# Patient Record
Sex: FEMALE | Race: WHITE | Employment: UNEMPLOYED | URBAN - METROPOLITAN AREA
[De-identification: names, ages, dates, MRNs, and addresses within clinical notes are randomized per-mention and may not be internally consistent; named-entity substitution may affect disease eponyms.]

---

## 2017-04-27 ENCOUNTER — TELEPHONE (OUTPATIENT)
Dept: FAMILY MEDICINE CLINIC | Age: 62
End: 2017-04-27

## 2017-04-27 NOTE — TELEPHONE ENCOUNTER
Patient call, patient requesting referral for gastro, would like to see Nat Taveras [phone], [phone] fax Please generate referral.  Do you need to see her first?

## 2017-05-24 ENCOUNTER — OFFICE VISIT (OUTPATIENT)
Dept: FAMILY MEDICINE CLINIC | Age: 62
End: 2017-05-24

## 2017-05-24 VITALS
TEMPERATURE: 97.5 F | RESPIRATION RATE: 18 BRPM | DIASTOLIC BLOOD PRESSURE: 68 MMHG | BODY MASS INDEX: 35.2 KG/M2 | SYSTOLIC BLOOD PRESSURE: 110 MMHG | HEART RATE: 75 BPM | OXYGEN SATURATION: 95 % | WEIGHT: 219 LBS | HEIGHT: 66 IN

## 2017-05-24 DIAGNOSIS — R10.84 GENERALIZED ABDOMINAL PAIN: ICD-10-CM

## 2017-05-24 DIAGNOSIS — R10.13 EPIGASTRIC PAIN: Primary | ICD-10-CM

## 2017-05-24 DIAGNOSIS — R11.2 NAUSEA AND VOMITING, INTRACTABILITY OF VOMITING NOT SPECIFIED, UNSPECIFIED VOMITING TYPE: ICD-10-CM

## 2017-05-24 NOTE — PROGRESS NOTES
1. Have you been to the ER, urgent care clinic since your last visit? Hospitalized since your last visit? No     2. Have you seen or consulted any other health care providers outside of the [address] [place] [address] since your last visit? Include any pap smears or colon screening. Yes , gyne, may have bladder lift  upcoming     Pt had previous umbilical hernia with revision, feels pain the same way. Weight gain, notes  moved back in .

## 2017-05-24 NOTE — MR AVS SNAPSHOT
Visit Information Date & Time Provider Department Dept. Phone Encounter #  
 5/24/2017  1:15 PM [address] [facility] [phone] [id number] Upcoming Health Maintenance Date Due Hepatitis C Screening 1955 BREAST CANCER SCRN MAMMOGRAM 5/12/2017 INFLUENZA AGE 9 TO ADULT 8/1/2017 COLON CANCER SCRN (BARIUM / CT COLO / FLX SIG) Q5 1/1/2019 PAP AKA CERVICAL CYTOLOGY 5/17/2020 DTaP/Tdap/Td series (2 - Td) 7/3/2024 Allergies as of 5/24/2017  Review Complete On: 5/24/2017 By: Rosmery Hampton MD  
  
 Severity Noted Reaction Type Reactions Amoxicillin  10/06/2015    Rash  
 Sulfa (Sulfonamide Antibiotics)  10/06/2015    Rash Tolectin [Tolmetin]  10/06/2015    Rash Current Immunizations  Reviewed on 10/6/2015 Name Date Influenza Vaccine 11/7/2013 Tdap 7/3/2014 Not reviewed this visit You Were Diagnosed With   
  
 Codes Comments Epigastric pain    -  Primary ICD-10-CM: R10.13 ICD-9-CM: 789.06 Generalized abdominal pain     ICD-10-CM: R10.84 ICD-9-CM: 789.07 Nausea and vomiting, intractability of vomiting not specified, unspecified vomiting type     ICD-10-CM: R11.2 ICD-9-CM: 787.01 Vitals BP Pulse Temp Resp Height(growth percentile) Weight(growth percentile) 110/68 (BP 1 Location: Left arm, BP Patient Position: Sitting) 75 97.5 °F (36.4 °C) (Oral) 18 5' 6\" (1.676 m) 219 lb (99.3 kg) SpO2 BMI OB Status Smoking Status 95% 35.35 kg/m2 Ablation Never Smoker Vitals History BMI and BSA Data Body Mass Index Body Surface Area  
 35.35 kg/m 2 2.15 m 2 Preferred Pharmacy Pharmacy Name Phone 259 [facility], [address] [place] [address] [phone] Your Updated Medication List  
  
   
This list is accurate as of: 5/24/17  1:45 PM.  Always use your most recent med list.  
  
  
  
  
 albuterol 90 mcg/actuation inhaler Commonly known as:  PROVENTIL HFA, VENTOLIN HFA, PROAIR HFA Take 2 Puffs by inhalation every six (6) hours as needed for Wheezing or Shortness of Breath. Biotin 2,500 mcg Cap Take 1,000 mcg by mouth daily. budesonide 3 mg capsule Commonly known as:  ENTOCORT EC Take 6 mg by mouth three (3) times daily. CITRIMAX PO Take  by mouth two (2) times a day. coenzyme q10-vitamin e 100-100 mg-unit Cap Take  by mouth daily. dicyclomine 10 mg capsule Commonly known as:  BENTYL Take 10 mg by mouth three (3) times daily. diphenoxylate-atropine 2.5-0.025 mg per tablet Commonly known as:  LOMOTIL Take  by mouth three (3) times daily as needed for Diarrhea. ibuprofen 800 mg tablet Commonly known as:  MOTRIN Take  by mouth every six (6) hours as needed for Pain. Lactobacillus acidophilus Cap Commonly known as:  BACID Take 2 Caps by mouth two (2) times a day. LORazepam 1 mg tablet Commonly known as:  ATIVAN  
TAKE 1 TABLET BY MOUTH TWICE DAILY AS NEEDED FOR ANXIETY  
  
 M-VIT PO Take  by mouth daily. magnesium oxide 400 mg tablet Commonly known as:  MAG-OX Take 400 mg by mouth two (2) times a day. naproxen 500 mg tablet Commonly known as:  NAPROSYN Take 500 mg by mouth two (2) times daily (with meals). Omega-3-DHA-EPA-Fish Oil 1,000 mg (120 mg-180 mg) Cap Take 2,000 mg by mouth daily. omeprazole 40 mg capsule Commonly known as:  PRILOSEC Take 1 Cap by mouth daily. REFRESH LIQUIGEL 1 % Dlgl Generic drug:  carboxymethylcellulose sodium Apply  to eye. RESTASIS 0.05 % ophthalmic emulsion Generic drug:  cycloSPORINE Administer 1 Drop to both eyes two (2) times a day. traZODone 100 mg tablet Commonly known as:  Luellen Loft Take 2 Tabs by mouth nightly as needed for Sleep.  
  
 triamcinolone acetonide 0.1 % topical cream  
Commonly known as:  KENALOG Apply  to affected area two (2) times a day. use thin layer  
  
 venlafaxine-SR 75 mg capsule Commonly known as:  EFFEXOR-XR Take 1 Cap by mouth daily. ZOLMitriptan 2.5 mg tablet Commonly known as:  ZOMIG Take 1 Tab by mouth as needed for Migraine. To-Do List   
 05/24/2017 Imaging:  CT ABD PELV W WO CONT Introducing [facility]! Soto Wilson introduces yourdelivery patient portal. Now you can access parts of your medical record, email your doctor's office, and request medication refills online. 1. In your internet browser, go to [email or web address] 2. Click on the First Time User? Click Here link in the Sign In box. You will see the New Member Sign Up page. 3. Enter your yourdelivery Access Code exactly as it appears below. You will not need to use this code after youve completed the sign-up process. If you do not sign up before the expiration date, you must request a new code. · yourdelivery Access Code: [email or web address] Expires: 8/22/2017  1:45 PM 
 
4. Enter the last four digits of your Social Security Number (xxxx) and Date of Birth (mm/dd/yyyy) as indicated and click Submit. You will be taken to the next sign-up page. 5. Create a yourdelivery ID. This will be your yourdelivery login ID and cannot be changed, so think of one that is secure and easy to remember. 6. Create a yourdelivery password. You can change your password at any time. 7. Enter your Password Reset Question and Answer. This can be used at a later time if you forget your password. 8. Enter your e-mail address. You will receive e-mail notification when new information is available in [address] 9. Click Sign Up. You can now view and download portions of your medical record. 10. Click the Download Summary menu link to download a portable copy of your medical information.  
 
If you have questions, please visit the Frequently Asked Questions section of the [employer]. Remember, [employer] is NOT to be used for urgent needs. For medical emergencies, dial 911. Now available from your iPhone and Android! Please provide this summary of care documentation to your next provider. Your primary care clinician is listed as Tamiko Landin. If you have any questions after today's visit, please call [phone].

## 2017-05-24 NOTE — PROGRESS NOTES
Assessment/Plan:    1. Epigastric pain and generalized abd pain with n/v - in setting of h/o umbilical hernia repair x 2 - will ck CT given level of pain and vomiting. If neg, will need GI referral for endoscopy. - CT ABD PELV W WO CONT; Future      The plan was discussed with the patient. The patient verbalized understanding and is in agreement with the plan. All medication potential side effects were discussed with the patient. Health Maintenance:   Health Maintenance   Topic Date Due    Hepatitis C Screening  1955    BREAST CANCER SCRN MAMMOGRAM  05/12/2017    INFLUENZA AGE 9 TO ADULT  08/01/2017    COLON CANCER SCRN (BARIUM / CT COLO / FLX SIG) Q5  01/01/2019    PAP AKA CERVICAL CYTOLOGY  05/17/2020    DTaP/Tdap/Td series (2 - Td) 07/03/2024    ZOSTER VACCINE AGE 60>  Completed       Josué Contreras is a 64 y.o. female and presents with Vomiting and Abdominal Pain     Subjective:  Pt c/o abd midline umbilical pain x 3 weeks. +n/v.   States it feels like when she had a umbilical hernia (has had it repaired 2004, 2005). Laying down makes the pain better. Any movement makes it worse. No diarrhea. Pain is 9/10    ROS:  Constitutional: No recent weight change. No weakness/fatigue. No f/c. Cardiovascular: No CP/palpitations. No FREDERICK/orthopnea/PND. Respiratory: No cough/sputum, dyspnea, wheezing. Gastointestinal: No dysphagia, reflux. No n/v. No constipation/diarrhea. No melena/rectal bleeding. The problem list was updated as a part of today's visit.   Patient Active Problem List   Diagnosis Code    Insomnia G47.00    Fibromyalgia M79.7    Anxiety and depression F41.9, F32.9    Migraines G43.909    Irritable bowel syndrome K58.9    Ulcerative colitis ([facility] [address] K51.90    Atopic rhinitis J30.9    Gastroesophageal reflux disease K21.9    Tear film insufficiency H04.129    Dry eye syndrome of both lacrimal glands H04.123    Pure hypercholesterolemia E78.00       The PS,  were reviewed. SH:  Social History   Substance Use Topics    Smoking status: Never Smoker    Smokeless tobacco: Never Used    Alcohol use Yes      Comment: Every once in awhile       Medications/Allergies:  Current Outpatient Prescriptions on File Prior to Visit   Medication Sig Dispense Refill    LORazepam (ATIVAN) 1 mg tablet TAKE 1 TABLET BY MOUTH TWICE DAILY AS NEEDED FOR ANXIETY 120 Tab 0    triamcinolone acetonide (KENALOG) 0.1 % topical cream Apply  to affected area two (2) times a day. use thin layer 45 g 0    carboxymethylcellulose sodium (REFRESH LIQUIGEL) 1 % dlgl Apply  to eye.  venlafaxine-SR (EFFEXOR-XR) 75 mg capsule Take 1 Cap by mouth daily. 90 Cap 1    traZODone (DESYREL) 100 mg tablet Take 2 Tabs by mouth nightly as needed for Sleep. 180 Tab 2    omeprazole (PRILOSEC) 40 mg capsule Take 1 Cap by mouth daily. 90 Cap 3    ZOLMitriptan (ZOMIG) 2.5 mg tablet Take 1 Tab by mouth as needed for Migraine. 27 Tab 2    ibuprofen (MOTRIN) 800 mg tablet Take  by mouth every six (6) hours as needed for Pain.  Lactobacillus acidophilus (BACID) cap Take 2 Caps by mouth two (2) times a day.  Biotin 2,500 mcg cap Take 1,000 mcg by mouth daily.  budesonide (ENTOCORT EC) 3 mg capsule Take 6 mg by mouth three (3) times daily.  DANIELLE PHOS/BRINDAL BERRY (CITRIMAX PO) Take  by mouth two (2) times a day.  dicyclomine (BENTYL) 10 mg capsule Take 10 mg by mouth three (3) times daily.  diphenoxylate-atropine (LOMOTIL) 2.5-0.025 mg per tablet Take  by mouth three (3) times daily as needed for Diarrhea.  magnesium oxide (MAG-OX) 400 mg tablet Take 400 mg by mouth two (2) times a day.  PV W-O DANIELLE/FERROUS FUMARATE/FA (M-VIT PO) Take  by mouth daily.  naproxen (NAPROSYN) 500 mg tablet Take 500 mg by mouth two (2) times daily (with meals).  coenzyme q10-vitamin e 100-100 mg-unit cap Take  by mouth daily.       Omega-3-DHA-EPA-Fish Oil 1,000 mg (120 mg-180 mg) cap Take 2,000 mg by mouth daily.  albuterol (PROVENTIL HFA, VENTOLIN HFA, PROAIR HFA) 90 mcg/actuation inhaler Take 2 Puffs by inhalation every six (6) hours as needed for Wheezing or Shortness of Breath. 1 Inhaler 3    cycloSPORINE (RESTASIS) 0.05 % ophthalmic emulsion Administer 1 Drop to both eyes two (2) times a day. No current facility-administered medications on file prior to visit. Allergies   Allergen Reactions    Amoxicillin Rash    Sulfa (Sulfonamide Antibiotics) Rash    Tolectin [Tolmetin] Rash       Objective:  Visit Vitals    /68 (BP 1 Location: Left arm, BP Patient Position: Sitting)    Pulse 75    Temp 97.5 °F (36.4 °C) (Oral)    Resp 18    Ht 5' 6\" (1.676 m)    Wt 219 lb (99.3 kg)    SpO2 95%    BMI 35.35 kg/m2      Constitutional: Well developed, nourished, no distress, alert, obese habitus, nontoxic   CV: S1, S2.  RRR. No murmurs/rubs. No thrills palpated. No carotid bruits. Intact distal pulses. No edema. Pulm: No abnormalities on inspection. Clear to auscultation bilaterally. No wheezing/rhonchi. Normal effort. GI: Soft, epigastric tenderness (maximal), mild tenderness over LUQ and periumbilical, nondistended. hypoactive bowel sounds. No rebound or guarding.      Labwork and Ancillary Studies:    CBC w/Diff  Lab Results   Component Value Date/Time    WBC 8.9 12/29/2015 09:17 AM    HGB 12.7 12/29/2015 09:17 AM    PLATELET 858 72/51/2049 09:17 AM         Basic Metabolic Profile/LFTs  Lab Results   Component Value Date/Time    Sodium 144 01/11/2016 02:21 PM    Potassium 3.7 01/11/2016 02:21 PM    Chloride 108 01/11/2016 02:21 PM    CO2 25 01/11/2016 02:21 PM    Anion gap 11 01/11/2016 02:21 PM    Glucose 95 01/11/2016 02:21 PM    BUN 11 01/11/2016 02:21 PM    Creatinine 0.96 01/11/2016 02:21 PM    BUN/Creatinine ratio 11 01/11/2016 02:21 PM    GFR est AA >60 01/11/2016 02:21 PM    GFR est non-AA 59 01/11/2016 02:21 PM    Calcium 8.7 01/11/2016 02:21 PM Lab Results   Component Value Date/Time    ALT (SGPT) 17 01/11/2016 02:21 PM    AST (SGOT) 15 01/11/2016 02:21 PM    Alk.  phosphatase 89 01/11/2016 02:21 PM    Bilirubin, total 0.4 01/11/2016 02:21 PM       Cholesterol  Lab Results   Component Value Date/Time    Cholesterol, total 211 02/19/2016 09:42 AM    HDL Cholesterol 55 02/19/2016 09:42 AM    LDL, calculated 124.8 02/19/2016 09:42 AM    Triglyceride 156 02/19/2016 09:42 AM    CHOL/HDL Ratio 3.8 02/19/2016 09:42 AM

## 2017-06-01 DIAGNOSIS — R10.84 GENERALIZED ABDOMINAL PAIN: ICD-10-CM

## 2017-06-01 DIAGNOSIS — R11.2 NAUSEA AND VOMITING, INTRACTABILITY OF VOMITING NOT SPECIFIED, UNSPECIFIED VOMITING TYPE: ICD-10-CM

## 2017-06-01 DIAGNOSIS — R10.13 EPIGASTRIC PAIN: ICD-10-CM

## 2017-06-02 ENCOUNTER — TELEPHONE (OUTPATIENT)
Dept: FAMILY MEDICINE CLINIC | Age: 62
End: 2017-06-02

## 2017-06-02 NOTE — TELEPHONE ENCOUNTER
Patient requesting colonoscopy and endoscopy to be ordered, due to abdomen pain and explosive diarrhea. Please advise.

## 2017-06-07 ENCOUNTER — TELEPHONE (OUTPATIENT)
Dept: FAMILY MEDICINE CLINIC | Age: 62
End: 2017-06-07

## 2017-07-18 ENCOUNTER — HOSPITAL ENCOUNTER (OUTPATIENT)
Dept: LAB | Age: 62
Discharge: HOME | End: 2017-07-18
Payer: COMMERCIAL

## 2017-07-18 ENCOUNTER — OFFICE VISIT (OUTPATIENT)
Dept: FAMILY MEDICINE CLINIC | Age: 62
End: 2017-07-18

## 2017-07-18 ENCOUNTER — TELEPHONE (OUTPATIENT)
Dept: FAMILY MEDICINE CLINIC | Age: 62
End: 2017-07-18

## 2017-07-18 VITALS
BODY MASS INDEX: 35.84 KG/M2 | RESPIRATION RATE: 16 BRPM | WEIGHT: 223 LBS | TEMPERATURE: 98 F | DIASTOLIC BLOOD PRESSURE: 82 MMHG | SYSTOLIC BLOOD PRESSURE: 128 MMHG | OXYGEN SATURATION: 96 % | HEART RATE: 91 BPM | HEIGHT: 66 IN

## 2017-07-18 DIAGNOSIS — Z11.59 SPECIAL SCREENING EXAMINATION FOR VIRAL DISEASE: ICD-10-CM

## 2017-07-18 DIAGNOSIS — J30.9 ALLERGIC RHINITIS, UNSPECIFIED ALLERGIC RHINITIS TRIGGER, UNSPECIFIED RHINITIS SEASONALITY: ICD-10-CM

## 2017-07-18 DIAGNOSIS — H65.92 MIDDLE EAR EFFUSION, LEFT: Primary | ICD-10-CM

## 2017-07-18 DIAGNOSIS — R53.83 FATIGUE, UNSPECIFIED TYPE: ICD-10-CM

## 2017-07-18 DIAGNOSIS — F51.04 PSYCHOPHYSIOLOGICAL INSOMNIA: ICD-10-CM

## 2017-07-18 DIAGNOSIS — M25.572 ACUTE LEFT ANKLE PAIN: ICD-10-CM

## 2017-07-18 DIAGNOSIS — F41.9 ANXIETY AND DEPRESSION: Chronic | ICD-10-CM

## 2017-07-18 DIAGNOSIS — F32.A ANXIETY AND DEPRESSION: Chronic | ICD-10-CM

## 2017-07-18 PROBLEM — Z79.899 CONTROLLED SUBSTANCE AGREEMENT SIGNED: Status: ACTIVE | Noted: 2017-07-18

## 2017-07-18 LAB
ALBUMIN SERPL BCP-MCNC: 3.6 G/DL (ref 3.4–5)
ALBUMIN/GLOB SERPL: 1 {RATIO} (ref 0.8–1.7)
ALP SERPL-CCNC: 80 U/L (ref 45–117)
ALT SERPL-CCNC: 18 U/L (ref 13–56)
ANION GAP BLD CALC-SCNC: 9 MMOL/L (ref 3–18)
AST SERPL W P-5'-P-CCNC: 15 U/L (ref 15–37)
BILIRUB SERPL-MCNC: 0.3 MG/DL (ref 0.2–1)
BUN SERPL-MCNC: 11 MG/DL (ref 7–18)
BUN/CREAT SERPL: 11 (ref 12–20)
CALCIUM SERPL-MCNC: 8.7 MG/DL (ref 8.5–10.1)
CHLORIDE SERPL-SCNC: 105 MMOL/L (ref 100–108)
CO2 SERPL-SCNC: 26 MMOL/L (ref 21–32)
CREAT SERPL-MCNC: 1 MG/DL (ref 0.6–1.3)
ERYTHROCYTE [DISTWIDTH] IN BLOOD BY AUTOMATED COUNT: 13.1 % (ref 11.6–14.5)
GLOBULIN SER CALC-MCNC: 3.5 G/DL (ref 2–4)
GLUCOSE SERPL-MCNC: 92 MG/DL (ref 74–99)
HCT VFR BLD AUTO: 38.3 % (ref 35–45)
HGB BLD-MCNC: 12.7 G/DL (ref 12–16)
MCH RBC QN AUTO: 29.5 PG (ref 24–34)
MCHC RBC AUTO-ENTMCNC: 33.2 G/DL (ref 31–37)
MCV RBC AUTO: 89.1 FL (ref 74–97)
PLATELET # BLD AUTO: 282 K/UL (ref 135–420)
PMV BLD AUTO: 9.8 FL (ref 9.2–11.8)
POTASSIUM SERPL-SCNC: 4.3 MMOL/L (ref 3.5–5.5)
PROT SERPL-MCNC: 7.1 G/DL (ref 6.4–8.2)
RBC # BLD AUTO: 4.3 M/UL (ref 4.2–5.3)
SODIUM SERPL-SCNC: 140 MMOL/L (ref 136–145)
TSH SERPL DL<=0.05 MIU/L-ACNC: 1.63 UIU/ML (ref 0.36–3.74)
VIT B12 SERPL-MCNC: 175 PG/ML (ref 211–911)
WBC # BLD AUTO: 11.9 K/UL (ref 4.6–13.2)

## 2017-07-18 PROCEDURE — 84443 ASSAY THYROID STIM HORMONE: CPT | Performed by: INTERNAL MEDICINE

## 2017-07-18 PROCEDURE — 82607 VITAMIN B-12: CPT | Performed by: INTERNAL MEDICINE

## 2017-07-18 PROCEDURE — 36415 COLL VENOUS BLD VENIPUNCTURE: CPT | Performed by: INTERNAL MEDICINE

## 2017-07-18 PROCEDURE — 80053 COMPREHEN METABOLIC PANEL: CPT | Performed by: INTERNAL MEDICINE

## 2017-07-18 PROCEDURE — 86803 HEPATITIS C AB TEST: CPT | Performed by: INTERNAL MEDICINE

## 2017-07-18 PROCEDURE — 85027 COMPLETE CBC AUTOMATED: CPT | Performed by: INTERNAL MEDICINE

## 2017-07-18 RX ORDER — LORAZEPAM 1 MG/1
TABLET ORAL
Qty: 120 TAB | Refills: 0 | Status: SHIPPED | OUTPATIENT
Start: 2017-07-18 | End: 2017-10-05 | Stop reason: SDUPTHER
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
## 2017-07-18 NOTE — PROGRESS NOTES
Greg Brannon is a 64 y.o. female here today with complaints of left ankle pain. 1. Have you been to the ER, urgent care clinic since your last visit? Hospitalized since your last visit? Yes Reason for visit: july 11,2017, ankle pain    2. Have you seen or consulted any other health care providers outside of the [address] [place] [address] since your last visit? Include any pap smears or colon screening.  Yes Reason for visit: urogynecology

## 2017-07-18 NOTE — MR AVS SNAPSHOT
Visit Information Date & Time Provider Department Dept. Phone Encounter #  
 7/18/2017  1:00 PM Myrtle Mcallister, Angella E Hilary Layne [phone] [id number] Upcoming Health Maintenance Date Due Hepatitis C Screening 1955 INFLUENZA AGE 9 TO ADULT 8/1/2017 BREAST CANCER SCRN MAMMOGRAM 4/5/2018 COLON CANCER SCRN (BARIUM / CT COLO / FLX SIG) Q5 1/1/2019 PAP AKA CERVICAL CYTOLOGY 5/17/2020 DTaP/Tdap/Td series (2 - Td) 7/3/2024 Allergies as of 7/18/2017  Review Complete On: 7/18/2017 By: Myrtle Mcallister MD  
  
 Severity Noted Reaction Type Reactions Amoxicillin  10/06/2015    Rash  
 Sulfa (Sulfonamide Antibiotics)  10/06/2015    Rash Tolectin [Tolmetin]  10/06/2015    Rash Current Immunizations  Reviewed on 10/6/2015 Name Date Influenza Vaccine 11/7/2013 Tdap 7/3/2014 Not reviewed this visit You Were Diagnosed With   
  
 Codes Comments Middle ear effusion, left    -  Primary ICD-10-CM: H65.92 
ICD-9-CM: 381. 4 Allergic rhinitis, unspecified allergic rhinitis trigger, unspecified rhinitis seasonality     ICD-10-CM: J30.9 ICD-9-CM: 477.9 Fatigue, unspecified type     ICD-10-CM: R53.83 ICD-9-CM: 780.79 Psychophysiological insomnia     ICD-10-CM: F51.04 
ICD-9-CM: 307.42 Anxiety and depression     ICD-10-CM: F41.9, F32.9 ICD-9-CM: 300.00, 311 Acute left ankle pain     ICD-10-CM: M25.572 ICD-9-CM: 719.47 Special screening examination for viral disease     ICD-10-CM: Z11.59 
ICD-9-CM: V73.99 Vitals BP Pulse Temp Resp Height(growth percentile) Weight(growth percentile) 128/82 91 98 °F (36.7 °C) 16 5' 6\" (1.676 m) 223 lb (101.2 kg) SpO2 BMI OB Status Smoking Status 96% 35.99 kg/m2 Ablation Never Smoker Vitals History BMI and BSA Data Body Mass Index Body Surface Area 35.99 kg/m 2 2.17 m 2 Preferred Pharmacy Pharmacy Name Phone 69 [facility] 1394 [address] Your Updated Medication List  
  
   
This list is accurate as of: 7/18/17  1:25 PM.  Always use your most recent med list.  
  
  
  
  
 albuterol 90 mcg/actuation inhaler Commonly known as:  PROVENTIL HFA, VENTOLIN HFA, PROAIR HFA Take 2 Puffs by inhalation every six (6) hours as needed for Wheezing or Shortness of Breath. Biotin 2,500 mcg Cap Take 1,000 mcg by mouth daily. LORazepam 1 mg tablet Commonly known as:  ATIVAN  
TAKE 1 TABLET BY MOUTH TWICE DAILY AS NEEDED FOR ANXIETY  
  
 naproxen 500 mg tablet Commonly known as:  NAPROSYN Take 500 mg by mouth two (2) times daily (with meals). Omega-3-DHA-EPA-Fish Oil 1,000 mg (120 mg-180 mg) Cap Take 2,000 mg by mouth daily. omeprazole 40 mg capsule Commonly known as:  PRILOSEC Take 1 Cap by mouth daily. REFRESH LIQUIGEL 1 % Dlgl Generic drug:  carboxymethylcellulose sodium Apply  to eye. RESTASIS 0.05 % ophthalmic emulsion Generic drug:  cycloSPORINE Administer 1 Drop to both eyes two (2) times a day. traZODone 100 mg tablet Commonly known as:  Illene Dus Take 2 Tabs by mouth nightly as needed for Sleep.  
  
 venlafaxine-SR 75 mg capsule Commonly known as:  EFFEXOR-XR Take 1 Cap by mouth daily. ZOLMitriptan 2.5 mg tablet Commonly known as:  ZOMIG Take 1 Tab by mouth as needed for Migraine. Prescriptions Printed Refills LORazepam (ATIVAN) 1 mg tablet 0 Sig: TAKE 1 TABLET BY MOUTH TWICE DAILY AS NEEDED FOR ANXIETY Class: Print We Performed the Following REFERRAL TO ALLERGY [REF5 Custom] Comments:  
 Please evaluate patient for allergies, middle ear effusion REFERRAL TO ORTHOPEDIC SURGERY [REF62 Custom] Comments:  
 Please evaluate patient for L ankle pain and effusion. To-Do List   
 07/18/2017 Lab:  CBC W/O DIFF   
  
 07/18/2017 Lab: HCV AB W/RFLX TO SAILAJA   
  
 07/18/2017 Lab:  METABOLIC PANEL, COMPREHENSIVE   
  
 07/18/2017 Lab:  TSH 3RD GENERATION   
  
 07/18/2017 Lab:  VITAMIN B12 Referral Information Referral ID Referred By Referred To  
  
 [id number] Juan Shelley MD   
   [address], [place] Agu[place] Phone: [phone] Fax: [phone] Visits Status Start Date End Date 1 New Request 7/18/17 7/18/18 If your referral has a status of pending review or denied, additional information will be sent to support the outcome of this decision. Referral ID Referred By Referred To  
 [id number] Jonas Pica V Not Available Visits Status Start Date End Date 1 New Request 7/18/17 7/18/18 If your referral has a status of pending review or denied, additional information will be sent to support the outcome of this decision. Introducing \[facility]\"" & HEALTH SERVICES! [facility] introduces [employer] patient portal. Now you can access parts of your medical record, email your doctor's office, and request medication refills online. 1. In your internet browser, go to [email or web address] 2. Click on the First Time User? Click Here link in the Sign In box. You will see the New Member Sign Up page. 3. Enter your [email or web address] Access Code exactly as it appears below. You will not need to use this code after youve completed the sign-up process. If you do not sign up before the expiration date, you must request a new code. · [email or web address] Access Code: [email or web address] Expires: 8/22/2017  1:45 PM 
 
4. Enter the last four digits of your Social Security Number (xxxx) and Date of Birth (mm/dd/yyyy) as indicated and click Submit. You will be taken to the next sign-up page. 5. Create a [employer] ID. This will be your [email or web address] login ID and cannot be changed, so think of one that is secure and easy to remember. 6. Create a [employer] password. You can change your password at any time. 7. Enter your Password Reset Question and Answer. This can be used at a later time if you forget your password. 8. Enter your e-mail address. You will receive e-mail notification when new information is available in [address] 9. Click Sign Up. You can now view and download portions of your medical record. 10. Click the Download Summary menu link to download a portable copy of your medical information. If you have questions, please visit the Frequently Asked Questions section of the [employer] website. Remember, [employer] is NOT to be used for urgent needs. For medical emergencies, dial 911. Now available from your iPhone and Android! Please provide this summary of care documentation to your next provider. Your primary care clinician is listed as Tamiko 13. If you have any questions after today's visit, please call [phone].

## 2017-07-19 DIAGNOSIS — E53.8 B12 DEFICIENCY: Primary | ICD-10-CM

## 2017-07-19 LAB
HCV AB S/CO SERPL IA: <0.1 S/CO RATIO (ref 0–0.9)
HCV AB SERPL QL IA: NORMAL

## 2017-07-19 NOTE — PROGRESS NOTES
Tell pt her b12 is low. I want her to start taking the oral b12 supplement she bought. We will repeat the levels in one month.

## 2017-07-19 NOTE — PROGRESS NOTES
Patient notified of results she voiced understanding  and will start oral b 12 and come back to repeat in one month

## 2017-07-25 ENCOUNTER — TELEPHONE (OUTPATIENT)
Dept: FAMILY MEDICINE CLINIC | Age: 62
End: 2017-07-25

## 2017-07-25 NOTE — TELEPHONE ENCOUNTER
Pt went to [facility] (7/12/17) and received 10 days of Hydrocodone (5-325 1 tab by mouth every 4 hr or as needed for pain) for pt's ankle. Pt is now out of the medication and requesting a refill until she can get in to see the Ortho doctor.

## 2017-09-18 ENCOUNTER — OFFICE VISIT (OUTPATIENT)
Dept: FAMILY MEDICINE CLINIC | Age: 62
End: 2017-09-18

## 2017-09-18 VITALS
TEMPERATURE: 98.1 F | HEIGHT: 66 IN | WEIGHT: 230.6 LBS | HEART RATE: 78 BPM | SYSTOLIC BLOOD PRESSURE: 126 MMHG | OXYGEN SATURATION: 96 % | BODY MASS INDEX: 37.06 KG/M2 | DIASTOLIC BLOOD PRESSURE: 78 MMHG

## 2017-09-18 DIAGNOSIS — F41.9 ANXIETY AND DEPRESSION: Chronic | ICD-10-CM

## 2017-09-18 DIAGNOSIS — E53.8 B12 DEFICIENCY: ICD-10-CM

## 2017-09-18 DIAGNOSIS — F32.A ANXIETY AND DEPRESSION: Chronic | ICD-10-CM

## 2017-09-18 DIAGNOSIS — M79.7 FIBROMYALGIA: Primary | Chronic | ICD-10-CM

## 2017-09-18 DIAGNOSIS — R53.82 CHRONIC FATIGUE: ICD-10-CM

## 2017-09-18 RX ORDER — VENLAFAXINE HYDROCHLORIDE 150 MG/1
150 CAPSULE, EXTENDED RELEASE ORAL DAILY
Qty: 90 CAP | Refills: 0 | Status: SHIPPED | OUTPATIENT
Start: 2017-09-18 | End: 2018-01-15 | Stop reason: ALTCHOICE

## 2017-09-18 NOTE — PROGRESS NOTES
Dalton Ramirez is a 58 y.o. female  Chief Complaint   Patient presents with    Medication Evaluation    Fatigue     1. Have you been to the ER, urgent care clinic or hospitalized since your last visit? NO.     2. Have you seen or consulted any other health care providers outside of the [address] [place] [address] since your last visit (Include any pap smears or colon screening)? NO      Do you have an Advanced Directive? NO    Would you like information on Advanced Directives?  NO

## 2017-09-18 NOTE — MR AVS SNAPSHOT
Visit Information Date & Time Provider Department Dept. Phone Encounter #  
 9/18/2017  3:15 PM Jose BenjaminRd [facility] [phone] [id number] Upcoming Health Maintenance Date Due  
 BREAST CANCER SCRN MAMMOGRAM 4/5/2018 COLON CANCER SCRN (BARIUM / CT COLO / FLX SIG) Q5 1/1/2019 PAP AKA CERVICAL CYTOLOGY 5/17/2020 DTaP/Tdap/Td series (2 - Td) 7/3/2024 Allergies as of 9/18/2017  Review Complete On: 7/18/2017 By: Jose Benjamin MD  
  
 Severity Noted Reaction Type Reactions Amoxicillin  10/06/2015    Rash  
 Sulfa (Sulfonamide Antibiotics)  10/06/2015    Rash Tolectin [Tolmetin]  10/06/2015    Rash Current Immunizations  Reviewed on 10/6/2015 Name Date Influenza Vaccine 11/7/2013 Tdap 7/3/2014 Not reviewed this visit You Were Diagnosed With   
  
 Codes Comments Fibromyalgia    -  Primary ICD-10-CM: M79.7 ICD-9-CM: 729.1 Chronic fatigue     ICD-10-CM: R53.82 
ICD-9-CM: 780.79   
 B12 deficiency     ICD-10-CM: E53.8 ICD-9-CM: 266.2 Anxiety and depression     ICD-10-CM: F41.9, F32.9 ICD-9-CM: 300.00, 311 Vitals BP Pulse Temp Height(growth percentile) Weight(growth percentile) SpO2  
 126/78 (BP 1 Location: Right arm, BP Patient Position: Sitting) 78 98.1 °F (36.7 °C) (Oral) 5' 6\" (1.676 m) 230 lb 9.6 oz (104.6 kg) 96% BMI OB Status Smoking Status 37.22 kg/m2 Ablation Never Smoker Vitals History BMI and BSA Data Body Mass Index Body Surface Area  
 37.22 kg/m 2 2.21 m 2 Preferred Pharmacy Pharmacy Name Phone 69 [facility] 3742 5940 [address] Your Updated Medication List  
  
   
This list is accurate as of: 9/18/17  3:50 PM.  Always use your most recent med list.  
  
  
  
  
 albuterol 90 mcg/actuation inhaler Commonly known as:  PROVENTIL HFA, VENTOLIN HFA, PROAIR HFA  
 Take 2 Puffs by inhalation every six (6) hours as needed for Wheezing or Shortness of Breath. Biotin 2,500 mcg Cap Take 1,000 mcg by mouth daily. LORazepam 1 mg tablet Commonly known as:  ATIVAN  
TAKE 1 TABLET BY MOUTH TWICE DAILY AS NEEDED FOR ANXIETY  
  
 naproxen 500 mg tablet Commonly known as:  NAPROSYN Take 500 mg by mouth two (2) times daily (with meals). Omega-3-DHA-EPA-Fish Oil 1,000 mg (120 mg-180 mg) Cap Take 2,000 mg by mouth daily. omeprazole 40 mg capsule Commonly known as:  PRILOSEC Take 1 Cap by mouth daily. REFRESH LIQUIGEL 1 % Dlgl Generic drug:  carboxymethylcellulose sodium Apply  to eye. RESTASIS 0.05 % ophthalmic emulsion Generic drug:  cycloSPORINE Administer 1 Drop to both eyes two (2) times a day. traZODone 100 mg tablet Commonly known as:  Eward Sheets Take 2 Tabs by mouth nightly as needed for Sleep.  
  
 venlafaxine- mg capsule Commonly known as:  EFFEXOR-XR Take 1 Cap by mouth daily. ZOLMitriptan 2.5 mg tablet Commonly known as:  ZOMIG Take 1 Tab by mouth as needed for Migraine. Prescriptions Sent to Pharmacy Refills  
 venlafaxine-SR (EFFEXOR-XR) 150 mg capsule 0 Sig: Take 1 Cap by mouth daily. Class: Normal  
 Pharmacy: [facility] [address]  #: [phone] Route: Oral  
  
We Performed the Following REFERRAL TO PHYSICAL THERAPY [GKX35 Custom] Comments:  
 Please evaluate patient for fibromyalgia program  
  
To-Do List   
 09/18/2017 Lab:  TSH 3RD GENERATION Referral Information Referral ID Referred By Referred To  
  
 [id number] [facility] V Not Available Visits Status Start Date End Date 1 New Request 9/18/17 9/18/18 If your referral has a status of pending review or denied, additional information will be sent to support the outcome of this decision. Introducing [facility] & HEALTH SERVICES! [facility] introduces [employer] patient portal. Now you can access parts of your medical record, email your doctor's office, and request medication refills online. 1. In your internet browser, go to [email or web address] 2. Click on the First Time User? Click Here link in the Sign In box. You will see the New Member Sign Up page. 3. Enter your [employer] Access Code exactly as it appears below. You will not need to use this code after youve completed the sign-up process. If you do not sign up before the expiration date, you must request a new code. · [employer] Access Code: [email or web address] Expires: 12/17/2017  3:50 PM 
 
4. Enter the last four digits of your Social Security Number (xxxx) and Date of Birth (mm/dd/yyyy) as indicated and click Submit. You will be taken to the next sign-up page. 5. Create a [employer] ID. This will be your [employer] login ID and cannot be changed, so think of one that is secure and easy to remember. 6. Create a [employer] password. You can change your password at any time. 7. Enter your Password Reset Question and Answer. This can be used at a later time if you forget your password. 8. Enter your e-mail address. You will receive e-mail notification when new information is available in [address] 9. Click Sign Up. You can now view and download portions of your medical record. 10. Click the Download Summary menu link to download a portable copy of your medical information. If you have questions, please visit the Frequently Asked Questions section of the [employer] website. Remember, [employer] is NOT to be used for urgent needs. For medical emergencies, dial 911. Now available from your iPhone and Android! Please provide this summary of care documentation to your next provider. Your primary care clinician is listed as Tamiko 13. If you have any questions after today's visit, please call [phone].

## 2017-09-18 NOTE — PROGRESS NOTES
Assessment/Plan:    1. Fibromyalgia  - REFERRAL TO PHYSICAL THERAPY    2. Chronic fatigue  - TSH 3RD GENERATION; Future    3. B12 deficiency  -liz labs    4. Anxiety and depression-uncontrolled. incr dose to 150mg. F/u in 1mo  - venlafaxine-SR [facility] 150 mg capsule; Take 1 Cap by mouth daily. Dispense: 90 Cap; Refill: 0    The plan was discussed with the patient. The patient verbalized understanding and is in agreement with the plan. All medication potential side effects were discussed with the patient. Health Maintenance:   Health Maintenance   Topic Date Due    BREAST CANCER SCRN MAMMOGRAM  04/05/2018    COLON CANCER SCRN (BARIUM / CT COLO / FLX SIG) Q5  01/01/2019    PAP AKA CERVICAL CYTOLOGY  05/17/2020    DTaP/Tdap/Td series (2 - Td) 07/03/2024    Hepatitis C Screening  Completed    ZOSTER VACCINE AGE 60>  Completed    INFLUENZA AGE 9 TO ADULT  Addressed       Berkeley Oppenheim is a 58 y.o. female and presents with Medication Evaluation and Fatigue     Subjective:  Pt c/o L ear pain. States she's been doing otc nasal steroid. Didn't make appt with ENT b/c she lost phone number. She c/o fatigue and just \"wanting to sleep\" x 1 week. She states she has no energy. Was low on b12, hasn't rechecked her eyes. PHQ9 indicates moderately severe depression. insomnia - states one trazodone is not enough, but two is too much. She is taking 1.5 tabs and thinks it's only helping ok. ROS:  Constitutional: No recent weight change. No weakness/+fatigue. No f/c. HENT: No HA, dizziness. No hearing loss/tinnitus. No nasal congestion/discharge. Eyes: No change in vision, double/blurred vision or eye pain/redness. Cardiovascular: No CP/palpitations. No FREDERICK/orthopnea/PND. Respiratory: No cough/sputum, dyspnea, wheezing. Gastointestinal: No dysphagia, reflux. No n/v. No constipation/diarrhea. No melena/rectal bleeding. Genitourinary: No dysuria, urinary hesitancy, nocturia, hematuria. No incontinence. Musculoskeletal: No joint pain/stiffness. No muscle pain/tenderness. Endo: No heat/cold intolerance, no polyuria/polydypsia. Heme: No h/o anemia. No easy bleeding/bruising. Allergy/Immunology: No seasonal rhinitis. Denies frequent colds, sinus/ear infections. Neurological: No seizures/numbness/weakness. No paresthesias. Psychiatric:  No depression, anxiety. The problem list was updated as a part of today's visit. Patient Active Problem List   Diagnosis Code    Insomnia G47.00    Fibromyalgia M79.7    Anxiety and depression F41.9, F32.9    Migraines G43.909    Irritable bowel syndrome K58.9    Ulcerative colitis ([facility] [address] K51.90    Atopic rhinitis J30.9    Gastroesophageal reflux disease K21.9    Tear film insufficiency H04.129    Dry eye syndrome of both lacrimal glands H04.123    Pure hypercholesterolemia E78.00    Controlled substance agreement signed Z79.899    B12 deficiency E53.8       The PSH, FH were reviewed. SH:  Social History   Substance Use Topics    Smoking status: Never Smoker    Smokeless tobacco: Never Used    Alcohol use Yes      Comment: Every once in awhile       Medications/Allergies:  Current Outpatient Prescriptions on File Prior to Visit   Medication Sig Dispense Refill    LORazepam (ATIVAN) 1 mg tablet TAKE 1 TABLET BY MOUTH TWICE DAILY AS NEEDED FOR ANXIETY 120 Tab 0    carboxymethylcellulose sodium (REFRESH LIQUIGEL) 1 % dlgl Apply  to eye.  venlafaxine-SR (EFFEXOR-XR) 75 mg capsule Take 1 Cap by mouth daily. 90 Cap 1    traZODone (DESYREL) 100 mg tablet Take 2 Tabs by mouth nightly as needed for Sleep. 180 Tab 2    omeprazole (PRILOSEC) 40 mg capsule Take 1 Cap by mouth daily. 90 Cap 3    ZOLMitriptan (ZOMIG) 2.5 mg tablet Take 1 Tab by mouth as needed for Migraine. 27 Tab 2    Biotin 2,500 mcg cap Take 1,000 mcg by mouth daily.  naproxen (NAPROSYN) 500 mg tablet Take 500 mg by mouth two (2) times daily (with meals).  Omega-3-DHA-EPA-Fish Oil 1,000 mg (120 mg-180 mg) cap Take 2,000 mg by mouth daily.  albuterol (PROVENTIL HFA, VENTOLIN HFA, PROAIR HFA) 90 mcg/actuation inhaler Take 2 Puffs by inhalation every six (6) hours as needed for Wheezing or Shortness of Breath. 1 Inhaler 3    cycloSPORINE (RESTASIS) 0.05 % ophthalmic emulsion Administer 1 Drop to both eyes two (2) times a day. No current facility-administered medications on file prior to visit. Allergies   Allergen Reactions    Amoxicillin Rash    Sulfa (Sulfonamide Antibiotics) Rash    Tolectin [Tolmetin] Rash       Objective:  Visit Vitals    /78 (BP 1 Location: Right arm, BP Patient Position: Sitting)    Pulse 78    Temp 98.1 °F (36.7 °C) (Oral)    Ht 5' 6\" (1.676 m)    Wt 230 lb 9.6 oz (104.6 kg)    SpO2 96%    BMI 37.22 kg/m2      Constitutional: Well developed, nourished, no distress, alert, obese habitus   HENT: Exterior ears and tympanic membranes normal bilaterally. Supple neck. No thyromegaly or lymphadenopathy. Oropharynx clear and moist mucous membranes. +bilat middle ear effusion   Eyes: Conjunctiva normal. PERRL. CV: S1, S2.  RRR. No murmurs/rubs. No thrills palpated. No carotid bruits. Intact distal pulses. No edema. Pulm: No abnormalities on inspection. Clear to auscultation bilaterally. No wheezing/rhonchi. Normal effort. Psych: Appropriate affect, judgement and insight. Short-term memory intact.        PHQ over the last two weeks 9/18/2017   Little interest or pleasure in doing things Nearly every day   Feeling down, depressed or hopeless Nearly every day   Total Score PHQ 2 6   Trouble falling or staying asleep, or sleeping too much Nearly every day   Feeling tired or having little energy Nearly every day   Poor appetite or overeating Nearly every day   Feeling bad about yourself - or that you are a failure or have let yourself or your family down Not at all   Trouble concentrating on things such as school, work, reading or watching TV More than half the days   Moving or speaking so slowly that other people could have noticed; or the opposite being so fidgety that others notice Not at all   Thoughts of being better off dead, or hurting yourself in some way Not at all   PHQ 9 Score 17

## 2017-10-05 DIAGNOSIS — F41.9 ANXIETY AND DEPRESSION: Chronic | ICD-10-CM

## 2017-10-05 DIAGNOSIS — F32.A ANXIETY AND DEPRESSION: Chronic | ICD-10-CM

## 2017-10-05 DIAGNOSIS — F51.04 PSYCHOPHYSIOLOGICAL INSOMNIA: ICD-10-CM

## 2017-10-05 RX ORDER — LORAZEPAM 1 MG/1
TABLET ORAL
Qty: 120 TAB | Refills: 1 | Status: SHIPPED | OUTPATIENT
Start: 2017-10-05 | End: 2018-01-05

## 2017-10-23 ENCOUNTER — HOSPITAL ENCOUNTER (OUTPATIENT)
Dept: LAB | Age: 62
Discharge: HOME | End: 2017-10-23
Payer: COMMERCIAL

## 2017-10-23 DIAGNOSIS — E53.8 B12 DEFICIENCY: ICD-10-CM

## 2017-10-23 DIAGNOSIS — R53.82 CHRONIC FATIGUE: ICD-10-CM

## 2017-10-23 LAB
TSH SERPL DL<=0.05 MIU/L-ACNC: 1.5 UIU/ML (ref 0.36–3.74)
VIT B12 SERPL-MCNC: 533 PG/ML (ref 211–911)

## 2017-10-23 PROCEDURE — 36415 COLL VENOUS BLD VENIPUNCTURE: CPT | Performed by: INTERNAL MEDICINE

## 2017-10-23 PROCEDURE — 82607 VITAMIN B-12: CPT | Performed by: INTERNAL MEDICINE

## 2017-10-23 PROCEDURE — 84443 ASSAY THYROID STIM HORMONE: CPT | Performed by: INTERNAL MEDICINE

## 2017-10-23 RX ORDER — OMEPRAZOLE 40 MG/1
40 CAPSULE, DELAYED RELEASE ORAL DAILY
Qty: 90 CAP | Refills: 3 | Status: SHIPPED | OUTPATIENT
Start: 2017-10-23 | End: 2018-06-27 | Stop reason: SDUPTHER

## 2017-11-28 DIAGNOSIS — F51.04 PSYCHOPHYSIOLOGICAL INSOMNIA: ICD-10-CM

## 2017-11-29 RX ORDER — TRAZODONE HYDROCHLORIDE 100 MG/1
TABLET ORAL
Qty: 180 TAB | Refills: 2 | Status: SHIPPED | OUTPATIENT
Start: 2017-11-29 | End: 2018-06-27 | Stop reason: SDUPTHER

## 2018-01-15 ENCOUNTER — TELEPHONE (OUTPATIENT)
Dept: FAMILY MEDICINE CLINIC | Age: 63
End: 2018-01-15

## 2018-01-15 ENCOUNTER — OFFICE VISIT (OUTPATIENT)
Dept: FAMILY MEDICINE CLINIC | Age: 63
End: 2018-01-15

## 2018-01-15 VITALS
HEART RATE: 63 BPM | SYSTOLIC BLOOD PRESSURE: 118 MMHG | RESPIRATION RATE: 20 BRPM | DIASTOLIC BLOOD PRESSURE: 82 MMHG | WEIGHT: 224.8 LBS | TEMPERATURE: 98.4 F | OXYGEN SATURATION: 97 % | BODY MASS INDEX: 36.13 KG/M2 | HEIGHT: 66 IN

## 2018-01-15 DIAGNOSIS — F32.A ANXIETY AND DEPRESSION: Primary | Chronic | ICD-10-CM

## 2018-01-15 DIAGNOSIS — F41.9 ANXIETY AND DEPRESSION: Primary | Chronic | ICD-10-CM

## 2018-01-15 RX ORDER — DESVENLAFAXINE SUCCINATE 50 MG/1
50 TABLET, EXTENDED RELEASE ORAL DAILY
Qty: 30 TAB | Refills: 1 | Status: SHIPPED | OUTPATIENT
Start: 2018-01-15 | End: 2018-02-20 | Stop reason: SDUPTHER

## 2018-01-15 NOTE — PATIENT INSTRUCTIONS
Desvenlafaxine (By mouth)   Desvenlafaxine (aji-uit-ub-FAX-een)  Treats depression. This medicine is an SSNRI. Brand Name(s): Giovanni Mcneil   There may be other brand names for this medicine. When This Medicine Should Not Be Used: This medicine is not right for everyone. Do not use it if you had an allergic reaction to desvenlafaxine or venlafaxine. How to Use This Medicine:   Long Acting Tablet  · Take your medicine as directed. Your dose may need to be changed several times to find what works best for you. It is best to take this medicine at the same time each day. · Swallow the tablet whole with water. Do not dissolve, crush, break, or chew the tablet. · You may have to take this medicine for several weeks before you feel better. If you feel that the medicine is not working well, do not take more than your normal dose. Call your doctor for instructions. · If you take the extended-release tablet, part of the tablet may pass into your stools. This is normal and is nothing to worry about. · This medicine should come with a Medication Guide. Ask your pharmacist for a copy if you do not have one. · Missed dose: Take a dose as soon as you remember. If it is almost time for your next dose, wait until then and take a regular dose. Do not take extra medicine to make up for a missed dose. · Store the medicine in a closed container at room temperature, away from heat, moisture, and direct light. Drugs and Foods to Avoid:   Ask your doctor or pharmacist before using any other medicine, including over-the-counter medicines, vitamins, and herbal products. · Do not use this medicine together with an MAO inhibitor (MAOI) or if you have used an MAOI within the past 14 days. Do not take an MAOI for at least 7 days after you stop this medicine. · Do not take any medicine that contains venlafaxine while you are using this medicine. · Some medicines can affect how desvenlafaxine works.  Tell your doctor if you are using any of the following:   ¨ Atomoxetine, buspirone, dextromethorphan, fentanyl, ketoconazole, lithium, metoprolol, midazolam, nebivolol, perphenazine, Vern's wort, tolterodine, tramadol, or a tryptophan supplement  ¨ Amphetamines  ¨ Blood thinner (including warfarin)  ¨ Diuretic (water pill)  ¨ NSAID pain or arthritis medicine (including aspirin, diclofenac, ibuprofen, naproxen)  ¨ Triptan medicine to treat migraine headaches  · Do not drink alcohol while you are using this medicine. Warnings While Using This Medicine:   · Tell your doctor if you are pregnant or breastfeeding, or if you have kidney disease, liver disease, bleeding problems, glaucoma, heart or blood vessel disease, high blood pressure, high cholesterol, or a history of maria elena, seizures, or stroke. · For some children, teenagers, and young adults, this medicine may increase mental or emotional problems. This may lead to thoughts of suicide and violence. Talk with your doctor right away if you have any thoughts or behavior changes that concern you. Tell your doctor if you or anyone in your family has a history of bipolar disorder or suicide attempts. · This medicine may cause the following problems:   ¨ Serotonin syndrome (may be life-threatening)  ¨ Increased risk of bleeding problems  ¨ Low sodium levels in the blood  · This medicine may make you dizzy or drowsy. Do not drive or do anything that could be dangerous until you know how this medicine affects you. · Do not stop using this medicine suddenly. Your doctor will need to slowly decrease your dose before you stop it completely. · Tell any doctor or dentist who treats you that you are using this medicine. This medicine may affect certain medical test results. · Your doctor will do lab tests at regular visits to check on the effects of this medicine. Keep all appointments. · Keep all medicine out of the reach of children. Never share your medicine with anyone.   Possible Side Effects While Using This Medicine:   Call your doctor right away if you notice any of these side effects:  · Allergic reaction: Itching or hives, swelling in your face or hands, swelling or tingling in your mouth or throat, chest tightness, trouble breathing  · Anxiety, restlessness, fever, sweating, muscle spasms, nausea, vomiting, diarrhea, seeing or hearing things that are not there  · Blistering, peeling, red skin rash  · Chest pain, cough, trouble breathing  · Confusion, weakness, muscle twitching, headache, trouble concentrating, unsteadiness  · Feeling more excited or energetic than usual, trouble sleeping  · Lightheadedness, fainting, fast or pounding heartbeat  · Seizures  · Thoughts of hurting yourself or others, unusual behavior  · Unusual bleeding or bruising  If you notice these less serious side effects, talk with your doctor:   · Decreased appetite, constipation  · Dizziness  · Dry mouth  · Eye pain, vision changes, seeing halos around lights  · Sexual problems  · Tiredness, sleepiness  If you notice other side effects that you think are caused by this medicine, tell your doctor. Call your doctor for medical advice about side effects. You may report side effects to FDA at 5-320-FDA-5378  © 2017 [address]  Information is for End User's use only and may not be sold, redistributed or otherwise used for commercial purposes. The above information is an  only. It is not intended as medical advice for individual conditions or treatments. Talk to your doctor, nurse or pharmacist before following any medical regimen to see if it is safe and effective for you.

## 2018-01-15 NOTE — TELEPHONE ENCOUNTER
Pt called to schedule urgent appt. States she is going through mediated divorce currently, is very upset, emotional, hurt. She said she has had fleeting suicidal thoughts but has no plan and would not act on those feelings. Double booked appt. Pt confirmed she would come. Dr. Nidhi tang.

## 2018-01-15 NOTE — PROGRESS NOTES
Assessment/Plan:    1. Anxiety and depression  -change from effexor to pristiq. Encouraged pt to make appt with counselor. No suicidal intentions  - desvenlafaxine succinate (PRISTIQ) 50 mg ER tablet; Take 1 Tab by mouth daily. Dispense: 30 Tab; Refill: 1    The plan was discussed with the patient. The patient verbalized understanding and is in agreement with the plan. All medication potential side effects were discussed with the patient. Health Maintenance:   Health Maintenance   Topic Date Due    BREAST CANCER SCRN MAMMOGRAM  04/05/2018    COLON CANCER SCRN (BARIUM / CT COLO / FLX SIG) Q5  01/01/2019    PAP AKA CERVICAL CYTOLOGY  05/17/2020    DTaP/Tdap/Td series (2 - Td) 07/03/2024    Hepatitis C Screening  Completed    ZOSTER VACCINE AGE 60>  Completed    Influenza Age 5 to Adult  Addressed       Ela Hobbs is a 58 y.o. female and presents with Depression     Subjective:  Depression - seen 9/2017 for uncontrolled depression and anxiety and effexor was increased. She didn't feel her sx were adequately controlled after that change though. Now, her  is back in the house and they are filing for divorce. She states she feels like she's living in [facility]. This stressor has worsened her depression sx. She's not sleeping well. Denies SI/HI. She is safe at home. She has a counselor. ROS:  Constitutional: No recent weight change. No weakness/fatigue. No f/c. Cardiovascular: No CP/palpitations. No FREDERICK/orthopnea/PND. Respiratory: No cough/sputum, dyspnea, wheezing. Gastointestinal: No dysphagia, reflux. No n/v. No constipation/diarrhea. No melena/rectal bleeding. Psychiatric:  + depression, +anxiety. The problem list was updated as a part of today's visit.   Patient Active Problem List   Diagnosis Code    Insomnia G47.00    Fibromyalgia M79.7    Anxiety and depression F41.8    Migraines G43.909    Irritable bowel syndrome K58.9    Ulcerative colitis ([facility] [address] K51.90    Atopic rhinitis J30.9    Gastroesophageal reflux disease K21.9    Tear film insufficiency H04.129    Dry eye syndrome of both lacrimal glands H04.123    Pure hypercholesterolemia E78.00    Controlled substance agreement signed Z79.899    B12 deficiency E53.8       The PSH, FH were reviewed. SH:  Social History   Substance Use Topics    Smoking status: Never Smoker    Smokeless tobacco: Never Used    Alcohol use Yes      Comment: Every once in awhile       Medications/Allergies:  Current Outpatient Prescriptions on File Prior to Visit   Medication Sig Dispense Refill    traZODone (DESYREL) 100 mg tablet TAKE TWO TABLETS BY MOUTH EVERY NIGHT AS NEEDED FOR SLEEP 180 Tab 2    omeprazole (PRILOSEC) 40 mg capsule Take 1 Cap by mouth daily. 90 Cap 3    venlafaxine-SR (EFFEXOR-XR) 150 mg capsule Take 1 Cap by mouth daily. 90 Cap 0    ZOLMitriptan (ZOMIG) 2.5 mg tablet Take 1 Tab by mouth as needed for Migraine. 27 Tab 2    Omega-3-DHA-EPA-Fish Oil 1,000 mg (120 mg-180 mg) cap Take 2,000 mg by mouth daily.  albuterol (PROVENTIL HFA, VENTOLIN HFA, PROAIR HFA) 90 mcg/actuation inhaler Take 2 Puffs by inhalation every six (6) hours as needed for Wheezing or Shortness of Breath. 1 Inhaler 3     No current facility-administered medications on file prior to visit. Allergies   Allergen Reactions    Amoxicillin Rash    Sulfa (Sulfonamide Antibiotics) Rash    Tolectin [Tolmetin] Rash       Objective:  Visit Vitals    /82 (BP 1 Location: Left arm, BP Patient Position: Sitting)    Pulse 63    Temp 98.4 °F (36.9 °C) (Oral)    Resp 20    Ht 5' 6\" (1.676 m)    Wt 224 lb 12.8 oz (102 kg)    SpO2 97%    BMI 36.28 kg/m2      Constitutional: Well developed, nourished, no distress, alert, obese habitus   CV: S1, S2.  RRR. No murmurs/rubs. No thrills palpated. No carotid bruits. Intact distal pulses. No edema. Pulm: No abnormalities on inspection. Clear to auscultation bilaterally.  No wheezing/rhonchi. Normal effort. GI: Soft, nontender, nondistended. Normal active bowel sounds. Neuro: A/O x 3. No focal motor or sensory deficits. Speech normal.   Psych: tearful affect, appropriate judgement and insight. Short-term memory intact.        PHQ over the last two weeks 1/15/2018   Little interest or pleasure in doing things Nearly every day   Feeling down, depressed or hopeless Nearly every day   Total Score PHQ 2 6   Trouble falling or staying asleep, or sleeping too much Nearly every day   Feeling tired or having little energy Nearly every day   Poor appetite or overeating Nearly every day   Feeling bad about yourself - or that you are a failure or have let yourself or your family down Nearly every day   Trouble concentrating on things such as school, work, reading or watching TV Nearly every day   Moving or speaking so slowly that other people could have noticed; or the opposite being so fidgety that others notice Not at all   Thoughts of being better off dead, or hurting yourself in some way Several days   PHQ 9 Score 22   How difficult have these problems made it for you to do your work, take care of your home and get along with others Extremely difficult

## 2018-01-15 NOTE — MR AVS SNAPSHOT
[facility] 
 
 
 [address] [facility] [id number] [phone] Patient: Tadeo Baez MRN: [id number] SRR:[id number] Visit Information Date & Time Provider Department Dept. Phone Encounter #  
 1/15/2018  1:30 PM Hang Leavitt, [facility] [phone] [id number] Upcoming Health Maintenance Date Due  
 BREAST CANCER SCRN MAMMOGRAM 4/5/2018 COLON CANCER SCRN (BARIUM / CT COLO / FLX SIG) Q5 1/1/2019 PAP AKA CERVICAL CYTOLOGY 5/17/2020 DTaP/Tdap/Td series (2 - Td) 7/3/2024 Allergies as of 1/15/2018  Review Complete On: 1/15/2018 By: Hang Leavitt MD  
  
 Severity Noted Reaction Type Reactions Amoxicillin  10/06/2015    Rash  
 Sulfa (Sulfonamide Antibiotics)  10/06/2015    Rash Tolectin [Tolmetin]  10/06/2015    Rash Current Immunizations  Reviewed on 10/6/2015 Name Date Influenza Vaccine 11/7/2013 Tdap 7/3/2014 Not reviewed this visit You Were Diagnosed With   
  
 Codes Comments Anxiety and depression    -  Primary ICD-10-CM: F41.8 ICD-9-CM: 300.00, 311 Vitals BP Pulse Temp Resp Height(growth percentile) Weight(growth percentile) 118/82 (BP 1 Location: Left arm, BP Patient Position: Sitting) 63 98.4 °F (36.9 °C) (Oral) 20 5' 6\" (1.676 m) 224 lb 12.8 oz (102 kg) SpO2 BMI OB Status Smoking Status 97% 36.28 kg/m2 Ablation Never Smoker BMI and BSA Data Body Mass Index Body Surface Area  
 36.28 kg/m 2 2.18 m 2 Preferred Pharmacy Pharmacy Name Phone [address], [address] [facility]. [address] [phone] Your Updated Medication List  
  
   
This list is accurate as of: 1/15/18  1:50 PM.  Always use your most recent med list.  
  
  
  
  
 albuterol 90 mcg/actuation inhaler Commonly known as:  PROVENTIL HFA, VENTOLIN HFA, PROAIR HFA  
 Take 2 Puffs by inhalation every six (6) hours as needed for Wheezing or Shortness of Breath. desvenlafaxine succinate 50 mg ER tablet Commonly known as:  PRISTIQ Take 1 Tab by mouth daily. Omega-3-DHA-EPA-Fish Oil 1,000 mg (120 mg-180 mg) Cap Take 2,000 mg by mouth daily. omeprazole 40 mg capsule Commonly known as:  PRILOSEC Take 1 Cap by mouth daily. traZODone 100 mg tablet Commonly known as:  DESYREL  
TAKE TWO TABLETS BY MOUTH EVERY NIGHT AS NEEDED FOR SLEEP  
  
 ZOLMitriptan 2.5 mg tablet Commonly known as:  ZOMIG Take 1 Tab by mouth as needed for Migraine. Prescriptions Sent to Pharmacy Refills  
 desvenlafaxine succinate (PRISTIQ) 50 mg ER tablet 1 Sig: Take 1 Tab by mouth daily. Class: Normal  
 Pharmacy: [facility] #: [phone] Route: Oral  
  
Patient Instructions Desvenlafaxine (By mouth) Desvenlafaxine (exo-hgf-gt-FAX-een) Treats depression. This medicine is an SSNRI. Brand Name(s): Britney Johnson There may be other brand names for this medicine. When This Medicine Should Not Be Used: This medicine is not right for everyone. Do not use it if you had an allergic reaction to desvenlafaxine or venlafaxine. How to Use This Medicine:  
Long Acting Tablet · Take your medicine as directed. Your dose may need to be changed several times to find what works best for you. It is best to take this medicine at the same time each day. · Swallow the tablet whole with water. Do not dissolve, crush, break, or chew the tablet. · You may have to take this medicine for several weeks before you feel better. If you feel that the medicine is not working well, do not take more than your normal dose. Call your doctor for instructions.  
· If you take the extended-release tablet, part of the tablet may pass into your stools. This is normal and is nothing to worry about. · This medicine should come with a Medication Guide. Ask your pharmacist for a copy if you do not have one. · Missed dose: Take a dose as soon as you remember. If it is almost time for your next dose, wait until then and take a regular dose. Do not take extra medicine to make up for a missed dose. · Store the medicine in a closed container at room temperature, away from heat, moisture, and direct light. Drugs and Foods to Avoid: Ask your doctor or pharmacist before using any other medicine, including over-the-counter medicines, vitamins, and herbal products. · Do not use this medicine together with an MAO inhibitor (MAOI) or if you have used an MAOI within the past 14 days. Do not take an MAOI for at least 7 days after you stop this medicine. · Do not take any medicine that contains venlafaxine while you are using this medicine. · Some medicines can affect how desvenlafaxine works. Tell your doctor if you are using any of the following: ¨ Atomoxetine, buspirone, dextromethorphan, fentanyl, ketoconazole, lithium, metoprolol, midazolam, nebivolol, perphenazine, Vern's wort, tolterodine, tramadol, or a tryptophan supplement ¨ Amphetamines ¨ Blood thinner (including warfarin) ¨ Diuretic (water pill) ¨ NSAID pain or arthritis medicine (including aspirin, diclofenac, ibuprofen, naproxen) ¨ Triptan medicine to treat migraine headaches · Do not drink alcohol while you are using this medicine. Warnings While Using This Medicine: · Tell your doctor if you are pregnant or breastfeeding, or if you have kidney disease, liver disease, bleeding problems, glaucoma, heart or blood vessel disease, high blood pressure, high cholesterol, or a history of maria elena, seizures, or stroke. · For some children, teenagers, and young adults, this medicine may increase mental or emotional problems.  This may lead to thoughts of suicide and violence. Talk with your doctor right away if you have any thoughts or behavior changes that concern you. Tell your doctor if you or anyone in your family has a history of bipolar disorder or suicide attempts. · This medicine may cause the following problems:  
¨ Serotonin syndrome (may be life-threatening) ¨ Increased risk of bleeding problems ¨ Low sodium levels in the blood · This medicine may make you dizzy or drowsy. Do not drive or do anything that could be dangerous until you know how this medicine affects you. · Do not stop using this medicine suddenly. Your doctor will need to slowly decrease your dose before you stop it completely. · Tell any doctor or dentist who treats you that you are using this medicine. This medicine may affect certain medical test results. · Your doctor will do lab tests at regular visits to check on the effects of this medicine. Keep all appointments. · Keep all medicine out of the reach of children. Never share your medicine with anyone. Possible Side Effects While Using This Medicine:  
Call your doctor right away if you notice any of these side effects: · Allergic reaction: Itching or hives, swelling in your face or hands, swelling or tingling in your mouth or throat, chest tightness, trouble breathing · Anxiety, restlessness, fever, sweating, muscle spasms, nausea, vomiting, diarrhea, seeing or hearing things that are not there · Blistering, peeling, red skin rash · Chest pain, cough, trouble breathing · Confusion, weakness, muscle twitching, headache, trouble concentrating, unsteadiness · Feeling more excited or energetic than usual, trouble sleeping · Lightheadedness, fainting, fast or pounding heartbeat · Seizures · Thoughts of hurting yourself or others, unusual behavior · Unusual bleeding or bruising If you notice these less serious side effects, talk with your doctor: · Decreased appetite, constipation · Dizziness · Dry mouth · Eye pain, vision changes, seeing halos around lights · Sexual problems · Tiredness, sleepiness If you notice other side effects that you think are caused by this medicine, tell your doctor. Call your doctor for medical advice about side effects. You may report side effects to FDA at 9-319-SHI-8719 © 2017 [address] Information is for End User's use only and may not be sold, redistributed or otherwise used for commercial purposes. The above information is an  only. It is not intended as medical advice for individual conditions or treatments. Talk to your doctor, nurse or pharmacist before following any medical regimen to see if it is safe and effective for you. Introducing [facility]! Jeromy Gallardo introduces [employer] patient portal. Now you can access parts of your medical record, email your doctor's office, and request medication refills online. 1. In your internet browser, go to [email or web address] 2. Click on the First Time User? Click Here link in the Sign In box. You will see the New Member Sign Up page. 3. Enter your [employer] Access Code exactly as it appears below. You will not need to use this code after youve completed the sign-up process. If you do not sign up before the expiration date, you must request a new code. · [employer] Access Code: [email or web address] Expires: 4/15/2018  1:50 PM 
 
4. Enter the last four digits of your Social Security Number (xxxx) and Date of Birth (mm/dd/yyyy) as indicated and click Submit. You will be taken to the next sign-up page. 5. Create a [employer] ID. This will be your [employer] login ID and cannot be changed, so think of one that is secure and easy to remember. 6. Create a [employer] password. You can change your password at any time. 7. Enter your Password Reset Question and Answer. This can be used at a later time if you forget your password. 8. Enter your e-mail address. You will receive e-mail notification when new information is available in [address] 9. Click Sign Up. You can now view and download portions of your medical record. 10. Click the Download Summary menu link to download a portable copy of your medical information. If you have questions, please visit the Frequently Asked Questions section of the .com website. Remember, .com is NOT to be used for urgent needs. For medical emergencies, dial 911. Now available from your iPhone and Android! Please provide this summary of care documentation to your next provider. Your primary care clinician is listed as Tam[facility] 13. If you have any questions after today's visit, please call [phone].

## 2018-01-15 NOTE — PROGRESS NOTES
Tadeo Baez is a 58 y.o. female is here for depression. She states its getting worse    1. Have you been to the ER, urgent care clinic since your last visit? Hospitalized since your last visit? No    2. Have you seen or consulted any other health care providers outside of the [facility] since your last visit? Include any pap smears or colon screening.  No     Health Maintenance Due   Topic Date Due    BREAST CANCER SCRN MAMMOGRAM  04/05/2018

## 2018-01-25 ENCOUNTER — TELEPHONE (OUTPATIENT)
Dept: FAMILY MEDICINE CLINIC | Age: 63
End: 2018-01-25

## 2018-01-25 NOTE — TELEPHONE ENCOUNTER
Pt called with questions. Her gyne doesn't take her insurance anymore, who do we recommend? Does she need to go to gyne anymore, perhaps just a \"breast specialist\" due to her problems she has? She also is asking if her newest medicine should be effective by now because it's not helping yet.

## 2018-01-25 NOTE — TELEPHONE ENCOUNTER
Kailash Meade from [facility] called stating they are sending over an order form to be signed by Dr. Steve العراقي. They found \"a very concerning breast mass\" that needs to have further evaluation.

## 2018-01-26 PROBLEM — N63.0 BREAST MASS IN FEMALE: Status: ACTIVE | Noted: 2018-01-26

## 2018-01-26 NOTE — TELEPHONE ENCOUNTER
Called [facility] [place] [facility] [facility]. Pt asked to go directly to Dr. Leda Valdivias for next steps. Appt set for Dr. Leda Valdivias 1/31/18.

## 2018-01-26 NOTE — TELEPHONE ENCOUNTER
Spoke to pt. She will Dr. Marcy Burgos, breast surgeon. Gave her Dr. Sumeet Higuera contact information. She will continue on current meds but states she may need to up her Ativan in the meanwhile.

## 2018-01-26 NOTE — TELEPHONE ENCOUNTER
New medication takes a whole month to see results - she's only been on it 11 days. Give her Dr. Sumeet Higuera contact info for gyn. Needs paps til age 72.

## 2018-02-20 ENCOUNTER — OFFICE VISIT (OUTPATIENT)
Dept: FAMILY MEDICINE CLINIC | Age: 63
End: 2018-02-20

## 2018-02-20 VITALS
SYSTOLIC BLOOD PRESSURE: 110 MMHG | HEART RATE: 77 BPM | HEIGHT: 66 IN | OXYGEN SATURATION: 97 % | DIASTOLIC BLOOD PRESSURE: 70 MMHG | TEMPERATURE: 98 F | RESPIRATION RATE: 20 BRPM | WEIGHT: 232 LBS | BODY MASS INDEX: 37.28 KG/M2

## 2018-02-20 DIAGNOSIS — R06.81 APNEIC EPISODE: ICD-10-CM

## 2018-02-20 DIAGNOSIS — F32.A ANXIETY AND DEPRESSION: Chronic | ICD-10-CM

## 2018-02-20 DIAGNOSIS — Z01.810 PREOP CARDIOVASCULAR EXAM: ICD-10-CM

## 2018-02-20 DIAGNOSIS — E66.09 CLASS 2 OBESITY DUE TO EXCESS CALORIES WITHOUT SERIOUS COMORBIDITY WITH BODY MASS INDEX (BMI) OF 37.0 TO 37.9 IN ADULT: ICD-10-CM

## 2018-02-20 DIAGNOSIS — C50.912 INFILTRATING DUCTAL CARCINOMA OF LEFT BREAST (HCC): ICD-10-CM

## 2018-02-20 DIAGNOSIS — F41.9 ANXIETY AND DEPRESSION: Chronic | ICD-10-CM

## 2018-02-20 DIAGNOSIS — R07.9 CHEST PAIN, UNSPECIFIED TYPE: Primary | ICD-10-CM

## 2018-02-20 DIAGNOSIS — R06.09 DOE (DYSPNEA ON EXERTION): ICD-10-CM

## 2018-02-20 PROBLEM — N63.0 BREAST MASS IN FEMALE: Status: RESOLVED | Noted: 2018-01-26 | Resolved: 2018-02-20

## 2018-02-20 RX ORDER — LORAZEPAM 1 MG/1
TABLET ORAL
COMMUNITY
End: 2018-05-21 | Stop reason: SDUPTHER

## 2018-02-20 RX ORDER — LANOLIN ALCOHOL/MO/W.PET/CERES
1000 CREAM (GRAM) TOPICAL DAILY
COMMUNITY

## 2018-02-20 RX ORDER — CYCLOSPORINE 0.5 MG/ML
1 EMULSION OPHTHALMIC 2 TIMES DAILY
COMMUNITY
End: 2018-05-21 | Stop reason: ALTCHOICE

## 2018-02-20 RX ORDER — DESVENLAFAXINE 100 MG/1
100 TABLET, EXTENDED RELEASE ORAL DAILY
Qty: 90 TAB | Refills: 0
Start: 2018-02-20 | End: 2018-03-06 | Stop reason: SDUPTHER

## 2018-02-20 NOTE — PROGRESS NOTES
Kim Kilpatrick is a 58 y.o. female and presents for pre-operative evaluation. Subjective: This patient was seen at the request of Dr. Caren Blanc for pre-operative evaluation for planned procedure: L breast partial mastectomy with SND on 3/13/18 under general anesthesia. Depression and anxiety - she is currently undergoing a divorce/separation and recent dx of breast ca. She was changed from effexor to pristiq. She hasn't noticed any improvement in her sx. She reports FREDERICK. States she's winded just walking across the house. Has cp, non-exertional.  Has well-controlled reflux. Had neg stress test 2016. Cardiac factors include:  HLD    METs: 5    ROS:  Constitutional: No recent weight change. +fatigue. No f/c. Skin: No rashes, change in nails/hair, itching   HENT: No HA, +dizziness. No hearing loss/tinnitus. No nasal congestion/discharge. Eyes: No change in vision, double/blurred vision or eye pain/redness. Cardiovascular: + CP/+ palpitations. +FREDERICK/no orthopnea/PND. Respiratory: + cough, no sputum, dyspnea, wheezing. Gastointestinal: No dysphagia, reflux. No n/v. No constipation/diarrhea. No melena/rectal bleeding. Genitourinary: No dysuria, urinary hesitancy, nocturia, hematuria. No incontinence. Musculoskeletal: No joint pain/stiffness. No muscle pain/tenderness. Heme: No h/o anemia. No easy bleeding/bruising. Neurological: No seizures/numbness/weakness. No paresthesias.      PMH:  Patient Active Problem List   Diagnosis Code    Insomnia G47.00    Fibromyalgia M79.7    Anxiety and depression F41.8    Migraines G43.909    Irritable bowel syndrome K58.9    Ulcerative colitis ([facility] [address] K51.90    Atopic rhinitis J30.9    Gastroesophageal reflux disease K21.9    Tear film insufficiency H04.129    Dry eye syndrome of both lacrimal glands H04.123    Pure hypercholesterolemia E78.00    Controlled substance agreement signed Z79.899    B12 deficiency E53.8    Infiltrating ductal carcinoma of left breast ([facility] [address] C50.912     PSH:  Past Surgical History:   Procedure Laterality Date    HX GYN      Uterus Ablation    HX HERNIA REPAIR  2004, 2005      SH:  Social History   Substance Use Topics    Smoking status: Never Smoker    Smokeless tobacco: Never Used    Alcohol use Yes      Comment: Every once in awhile     FH:  Family History   Problem Relation Age of Onset    Heart Disease Maternal Grandmother     Cancer Mother      breast cancer    Diabetes Father     Alzheimer Father     Lupus Brother        Medications/Allergies:  Current Outpatient Prescriptions on File Prior to Visit   Medication Sig Dispense Refill    desvenlafaxine succinate (PRISTIQ) 50 mg ER tablet Take 1 Tab by mouth daily. 30 Tab 1    traZODone (DESYREL) 100 mg tablet TAKE TWO TABLETS BY MOUTH EVERY NIGHT AS NEEDED FOR SLEEP 180 Tab 2    omeprazole (PRILOSEC) 40 mg capsule Take 1 Cap by mouth daily. 90 Cap 3    ZOLMitriptan (ZOMIG) 2.5 mg tablet Take 1 Tab by mouth as needed for Migraine. 27 Tab 2    Omega-3-DHA-EPA-Fish Oil 1,000 mg (120 mg-180 mg) cap Take 2,000 mg by mouth daily.  albuterol (PROVENTIL HFA, VENTOLIN HFA, PROAIR HFA) 90 mcg/actuation inhaler Take 2 Puffs by inhalation every six (6) hours as needed for Wheezing or Shortness of Breath. 1 Inhaler 3     No current facility-administered medications on file prior to visit. Allergies   Allergen Reactions    Amoxicillin Rash    Sulfa (Sulfonamide Antibiotics) Rash    Tolectin [Tolmetin] Rash       Objective:  Visit Vitals    /70 (BP 1 Location: Right arm, BP Patient Position: Sitting)    Pulse 77    Temp 98 °F (36.7 °C) (Oral)    Resp 20    Ht 5' 6\" (1.676 m)    Wt 232 lb (105.2 kg)    SpO2 97%    BMI 37.45 kg/m2      Gen: Well developed, nourished, no distress, alert, obese habitus   Eyes: Conjunctiva normal. PERRL. CV: S1, S2.  RRR. No murmurs/rubs. No thrills palpated. No carotid bruits. Intact distal pulses. No edema. Pulm: No abnormalities on inspection. Clear to auscultation bilaterally. No wheezing/rhonchi. Normal effort. GI: Soft, nontender, nondistended. Normal active bowel sounds. No  masses on palpation. No hepatosplenomegaly. MS: Gait normal.  Joints without deformity/tenderness. Neuro: A/O x 3. No focal motor or sensory deficits. Speech normal.   Skin: No lesions/rashes on inspection. Psych: Appropriate affect, judgement and insight. Short-term memory intact. Labwork and Ancillary Studies:     Labs reviewed in [facility] - only remarkable for mild leukocytosis    EKG: RSR' in V1, NSR    Assessment/Plan:    The patient is low risk for planned procedure. However, she reports chest pain and FREDERICK. Will do CXR today. Stress echo ordered. The following recommendations were made for medication regimen prior to surgery:  Hold NSAIDs 7 days prior to surgery. I will continue to follow along with the patient's treatment and care. For any questions please do not hesitate to call the office at [phone]. Depression - will increase pristiq to 100mg. Obesity - increase exercise. Lyndell Blizzard, MD    Addendum: CXR and stress echo were normal. Patient may proceed with surgery.

## 2018-02-20 NOTE — MR AVS SNAPSHOT
Bren Schumacher 
 
 
 [address] [facility] [id number] [phone] Patient: Ela Hobbs MRN: [id number] ZSI:[id number] Visit Information Date & Time Provider Department Dept. Phone Encounter #  
 2/20/2018  1:00 PM Micky Quintero, [address] [phone] [id number] Upcoming Health Maintenance Date Due COLON CANCER SCRN (BARIUM / CT COLO / FLX SIG) Q5 1/1/2019 Pneumococcal 19-64 Highest Risk (2 of 3 - PCV13) 2/20/2019 BREAST CANCER SCRN MAMMOGRAM 1/25/2020 PAP AKA CERVICAL CYTOLOGY 5/17/2020 DTaP/Tdap/Td series (2 - Td) 7/3/2024 Allergies as of 2/20/2018  Review Complete On: 2/20/2018 By: Micky Quintero MD  
  
 Severity Noted Reaction Type Reactions Amoxicillin  10/06/2015    Rash  
 Sulfa (Sulfonamide Antibiotics)  10/06/2015    Rash Tolectin [Tolmetin]  10/06/2015    Rash Current Immunizations  Reviewed on 10/6/2015 Name Date Influenza Vaccine 11/7/2013 Tdap 7/3/2014 Not reviewed this visit You Were Diagnosed With   
  
 Codes Comments FREDERICK (dyspnea on exertion)    -  Primary ICD-10-CM: R06.09 
ICD-9-CM: 786.09 Anxiety and depression     ICD-10-CM: F41.8 ICD-9-CM: 300.00, 311 Chest pain, unspecified type     ICD-10-CM: R07.9 ICD-9-CM: 786.50 Vitals BP Pulse Temp Resp Height(growth percentile) Weight(growth percentile) 110/70 (BP 1 Location: Right arm, BP Patient Position: Sitting) 77 98 °F (36.7 °C) (Oral) 20 5' 6\" (1.676 m) 232 lb (105.2 kg) SpO2 BMI OB Status Smoking Status 97% 37.45 kg/m2 Ablation Never Smoker Vitals History BMI and BSA Data Body Mass Index Body Surface Area  
 37.45 kg/m 2 2.21 m 2 Preferred Pharmacy Pharmacy Name Phone [address] [address] [address] Your Updated Medication List  
  
   
 This list is accurate as of: 2/20/18  1:25 PM.  Always use your most recent med list.  
  
  
  
  
 albuterol 90 mcg/actuation inhaler Commonly known as:  PROVENTIL HFA, VENTOLIN HFA, PROAIR HFA Take 2 Puffs by inhalation every six (6) hours as needed for Wheezing or Shortness of Breath. Desvenlafaxine 100 mg Tb24 Commonly known as:  PRISTIQ Take 1 Tab by mouth daily. LORazepam 1 mg tablet Commonly known as:  ATIVAN Take  by mouth every four (4) hours as needed for Anxiety. Omega-3-DHA-EPA-Fish Oil 1,000 mg (120 mg-180 mg) Cap Take 2,000 mg by mouth daily. omeprazole 40 mg capsule Commonly known as:  PRILOSEC Take 1 Cap by mouth daily. REFRESH OPTIVE 0.5-0.9 % Drop Generic drug:  Carboxymethylcellulose-Glycern Apply  to eye. RESTASIS 0.05 % ophthalmic emulsion Generic drug:  cycloSPORINE Administer 1 Drop to both eyes two (2) times a day. traZODone 100 mg tablet Commonly known as:  DESYREL  
TAKE TWO TABLETS BY MOUTH EVERY NIGHT AS NEEDED FOR SLEEP  
  
 VITAMIN B-12 1,000 mcg tablet Generic drug:  cyanocobalamin Take 1,000 mcg by mouth daily. ZOLMitriptan 2.5 mg tablet Commonly known as:  ZOMIG Take 1 Tab by mouth as needed for Migraine. To-Do List   
 02/20/2018 ECHO:  ECHO EXERCISE STRESS   
  
 02/20/2018 Imaging:  XR CHEST PA LAT Introducing [facility] SERVICES! Joby Tirado introduces [employer] patient portal. Now you can access parts of your medical record, email your doctor's office, and request medication refills online. 1. In your internet browser, go to [email or web address]. [email or web address] 2. Click on the First Time User? Click Here link in the Sign In box. You will see the New Member Sign Up page. 3. Enter your [employer] Access Code exactly as it appears below. You will not need to use this code after youve completed the sign-up process.  If you do not sign up before the expiration date, you must request a new code. · [employer] Access Code: [email or web address] Expires: 4/15/2018  1:50 PM 
 
4. Enter the last four digits of your Social Security Number (xxxx) and Date of Birth (mm/dd/yyyy) as indicated and click Submit. You will be taken to the next sign-up page. 5. Create a [employer] ID. This will be your [employer] login ID and cannot be changed, so think of one that is secure and easy to remember. 6. Create a [employer] password. You can change your password at any time. 7. Enter your Password Reset Question and Answer. This can be used at a later time if you forget your password. 8. Enter your e-mail address. You will receive e-mail notification when new information is available in [address] 9. Click Sign Up. You can now view and download portions of your medical record. 10. Click the Download Summary menu link to download a portable copy of your medical information. If you have questions, please visit the Frequently Asked Questions section of the [employer] website. Remember, [employer] is NOT to be used for urgent needs. For medical emergencies, dial 911. Now available from your iPhone and Android! Please provide this summary of care documentation to your next provider. Your primary care clinician is listed as Tamiko 13. If you have any questions after today's visit, please call [phone].

## 2018-02-20 NOTE — PROGRESS NOTES
Juan Nevarez is a 58 y.o. female (: 1955) presenting to address:    Chief Complaint   Patient presents with    Pre-op Exam       Vitals:    18 1259   BP: 110/70   Pulse: 77   Resp: 20   Temp: 98 °F (36.7 °C)   TempSrc: Oral   SpO2: 97%   Weight: 232 lb (105.2 kg)   Height: 5' 6\" (1.676 m)   PainSc:   4   PainLoc: Generalized       Learning Assessment:     Learning Assessment 10/6/2015   PRIMARY LEARNER Patient   HIGHEST LEVEL OF EDUCATION - PRIMARY LEARNER  SOME COLLEGE   BARRIERS PRIMARY LEARNER NONE   CO-LEARNER CAREGIVER No   PRIMARY LANGUAGE ENGLISH    NEED No   LEARNER PREFERENCE PRIMARY READING   LEARNING SPECIAL TOPICS none   ANSWERED BY patient   RELATIONSHIP SELF   ASSESSMENT COMMENT n/a     Depression Screening:     PHQ over the last two weeks 2018   PHQ Not Done Active Diagnosis of Depression or Bipolar Disorder   Little interest or pleasure in doing things -   Feeling down, depressed or hopeless -   Total Score PHQ 2 -   Trouble falling or staying asleep, or sleeping too much -   Feeling tired or having little energy -   Poor appetite or overeating -   Feeling bad about yourself - or that you are a failure or have let yourself or your family down -   Trouble concentrating on things such as school, work, reading or watching TV -   Moving or speaking so slowly that other people could have noticed; or the opposite being so fidgety that others notice -   Thoughts of being better off dead, or hurting yourself in some way -   PHQ 9 Score -   How difficult have these problems made it for you to do your work, take care of your home and get along with others -     Fall Risk Assessment:     Fall Risk Assessment, last 12 mths 2018   Able to walk? Yes   Fall in past 12 months? Yes   Fall with injury? No   Number of falls in past 12 months 1   Fall Risk Score 1     Abuse Screening:     Abuse Screening Questionnaire 2018   Do you ever feel afraid of your partner?  N   Are you in a relationship with someone who physically or mentally threatens you? N   Is it safe for you to go home? Y     Coordination of Care Questionaire:   1. Have you been to the ER, urgent care clinic since your last visit? Hospitalized since your last visit? NO    2. Have you seen or consulted any other health care providers outside of the [address] [place] [address] since your last visit? Include any pap smears or colon screening. YES Breast surgeon 1/31/18    Advanced Directive:   1. Do you have an Advanced Directive? YES    2. Would you like information on Advanced Directives?  NO

## 2018-03-01 ENCOUNTER — TELEPHONE (OUTPATIENT)
Dept: FAMILY MEDICINE CLINIC | Age: 63
End: 2018-03-01

## 2018-03-06 ENCOUNTER — TELEPHONE (OUTPATIENT)
Dept: FAMILY MEDICINE CLINIC | Age: 63
End: 2018-03-06

## 2018-03-06 DIAGNOSIS — F32.A ANXIETY AND DEPRESSION: Chronic | ICD-10-CM

## 2018-03-06 DIAGNOSIS — F41.9 ANXIETY AND DEPRESSION: Chronic | ICD-10-CM

## 2018-03-06 RX ORDER — DESVENLAFAXINE 100 MG/1
100 TABLET, EXTENDED RELEASE ORAL DAILY
Qty: 90 TAB | Refills: 0 | Status: SHIPPED | OUTPATIENT
Start: 2018-03-06 | End: 2018-06-27 | Stop reason: SDUPTHER

## 2018-03-20 DIAGNOSIS — R07.9 CHEST PAIN, UNSPECIFIED TYPE: ICD-10-CM

## 2018-03-26 ENCOUNTER — TELEPHONE (OUTPATIENT)
Dept: FAMILY MEDICINE CLINIC | Age: 63
End: 2018-03-26

## 2018-03-26 PROBLEM — E66.01 SEVERE OBESITY (BMI 35.0-39.9) WITH COMORBIDITY (HCC): Status: ACTIVE | Noted: 2018-03-26

## 2018-03-26 NOTE — TELEPHONE ENCOUNTER
Pt called. Surgery was 3/20/18. Pt was advised that she stopped breathing during surgery and they advise her to have a sleep study.  Please advise or place referral.

## 2018-05-21 ENCOUNTER — OFFICE VISIT (OUTPATIENT)
Dept: FAMILY MEDICINE CLINIC | Age: 63
End: 2018-05-21

## 2018-05-21 VITALS
SYSTOLIC BLOOD PRESSURE: 112 MMHG | WEIGHT: 229 LBS | BODY MASS INDEX: 36.8 KG/M2 | OXYGEN SATURATION: 95 % | TEMPERATURE: 98.6 F | DIASTOLIC BLOOD PRESSURE: 80 MMHG | HEART RATE: 86 BPM | HEIGHT: 66 IN | RESPIRATION RATE: 20 BRPM

## 2018-05-21 DIAGNOSIS — F32.A ANXIETY AND DEPRESSION: Chronic | ICD-10-CM

## 2018-05-21 DIAGNOSIS — F41.9 ANXIETY AND DEPRESSION: Chronic | ICD-10-CM

## 2018-05-21 DIAGNOSIS — Z12.11 SCREEN FOR COLON CANCER: ICD-10-CM

## 2018-05-21 DIAGNOSIS — J45.20 RAD (REACTIVE AIRWAY DISEASE), MILD INTERMITTENT, UNCOMPLICATED: ICD-10-CM

## 2018-05-21 DIAGNOSIS — R10.33 PERIUMBILICAL PAIN: ICD-10-CM

## 2018-05-21 DIAGNOSIS — R19.8 INCREASED ABDOMINAL GIRTH: Primary | ICD-10-CM

## 2018-05-21 DIAGNOSIS — L29.9 SCALP ITCH: ICD-10-CM

## 2018-05-21 RX ORDER — LORAZEPAM 1 MG/1
1 TABLET ORAL
Qty: 120 TAB | Refills: 1 | Status: SHIPPED | OUTPATIENT
Start: 2018-05-21 | End: 2018-08-20 | Stop reason: SDUPTHER

## 2018-05-21 RX ORDER — CLOBETASOL PROPIONATE 0.05 G/100ML
SHAMPOO TOPICAL
Qty: 118 ML | Refills: 0 | Status: SHIPPED | OUTPATIENT
Start: 2018-05-21

## 2018-05-21 RX ORDER — ALBUTEROL SULFATE 90 UG/1
2 AEROSOL, METERED RESPIRATORY (INHALATION)
Qty: 1 INHALER | Refills: 3 | Status: SHIPPED | OUTPATIENT
Start: 2018-05-21

## 2018-05-21 NOTE — PROGRESS NOTES
Assessment/Plan:    1. Increased abdominal girth, periumbilical pain - ck CT.    - CT ABD PELV W WO CONT; Future    2. RAD (reactive airway disease), mild intermittent, uncomplicated  -refilled  - albuterol (PROVENTIL HFA, VENTOLIN HFA, PROAIR HFA) 90 mcg/actuation inhaler; Take 2 Puffs by inhalation every six (6) hours as needed for Wheezing or Shortness of Breath. Dispense: 1 Inhaler; Refill: 3    3. Anxiety and depression  -refilled  - LORazepam (ATIVAN) 1 mg tablet; Take 1 Tab by mouth two (2) times daily as needed for Anxiety. Max Daily Amount: 2 mg. Dispense: 120 Tab; Refill: 1    4. Scalp itch  -failed otc meds  - clobetasol 0.05 % sham; Apply to scalp and leave in place for 5 minutes before rinsing. Use every other day for one week. Dispense: 118 mL; Refill: 0    5. Screen for colon cancer  - REFERRAL TO GASTROENTEROLOGY    The plan was discussed with the patient. The patient verbalized understanding and is in agreement with the plan. All medication potential side effects were discussed with the patient. Health Maintenance:   Health Maintenance   Topic Date Due    Influenza Age 5 to Adult  08/01/2018   Lissette Saunders COLON CANCER SCRN (BARIUM / CT COLO / FLX SIG) Q5  01/01/2019    Pneumococcal 19-64 Highest Risk (2 of 3 - PCV13) 02/20/2019    BREAST CANCER SCRN MAMMOGRAM  01/25/2020    PAP AKA CERVICAL CYTOLOGY  05/17/2020    DTaP/Tdap/Td series (2 - Td) 07/03/2024    Hepatitis C Screening  Completed    ZOSTER VACCINE AGE 60>  Completed       Madeline Valdes is a 58 y.o. female and presents with Abdominal Pain     Subjective:  Pt with h/o IBS presents with c/o periumbilical pain, intermittent. Pain is worse with applying pressure and will occ have nausea associated. She states she had umbilical hernia repair 7399. She wonders if it's returned. Depression and anxiety - uses ativan on a regular basis. Having fatigue, not sure if it's a side effect. Was changed to pristiq for uncontrolled depression. States she less irritable, but feels nothing. Having difficulty sleeping, despite trazodone. She notes a lack of appetite. She hasn't lost wt. But having increasing abd girth. She has had to go up 1-2 sizes in her clothing. Has dandruff and itchy scalp. Tried otc dandruff shampoo w/o relief. ROS:  Constitutional: No recent weight change. No weakness/fatigue. No f/c. Cardiovascular: No CP/palpitations. No FREDERICK/orthopnea/PND. Respiratory: No cough/sputum, dyspnea, wheezing. Gastointestinal: No dysphagia, reflux. No n/v. No constipation/diarrhea. No melena/rectal bleeding. Genitourinary: No dysuria, urinary hesitancy, nocturia, hematuria. No incontinence. The problem list was updated as a part of today's visit. Patient Active Problem List   Diagnosis Code    Insomnia G47.00    Fibromyalgia M79.7    Anxiety and depression F41.9, F32.9    Migraines G43.909    Irritable bowel syndrome K58.9    Ulcerative colitis ([facility] [address] K51.90    Atopic rhinitis J30.9    Gastroesophageal reflux disease K21.9    Tear film insufficiency H04.129    Dry eye syndrome of both lacrimal glands H04.123    Pure hypercholesterolemia E78.00    Controlled substance agreement signed Z79.899    B12 deficiency E53.8    Infiltrating ductal carcinoma of left breast ([facility] [address] C50.912    Severe obesity (BMI 35.0-39. 9) with comorbidity ([facility] [address] E66.01       The PSH, FH were reviewed. SH:  Social History   Substance Use Topics    Smoking status: Never Smoker    Smokeless tobacco: Never Used    Alcohol use Yes      Comment: Every once in awhile       Medications/Allergies:  Current Outpatient Prescriptions on File Prior to Visit   Medication Sig Dispense Refill    Desvenlafaxine 100 mg Tb24 Take 1 Tab by mouth daily. 90 Tab 0    LORazepam (ATIVAN) 1 mg tablet Take  by mouth every four (4) hours as needed for Anxiety.  Carboxymethylcellulose-Glycern (REFRESH OPTIVE) 0.5-0.9 % drop Apply  to eye.       cyanocobalamin (VITAMIN B-12) 1,000 mcg tablet Take 1,000 mcg by mouth daily.  traZODone (DESYREL) 100 mg tablet TAKE TWO TABLETS BY MOUTH EVERY NIGHT AS NEEDED FOR SLEEP 180 Tab 2    omeprazole (PRILOSEC) 40 mg capsule Take 1 Cap by mouth daily. 90 Cap 3    ZOLMitriptan (ZOMIG) 2.5 mg tablet Take 1 Tab by mouth as needed for Migraine. 27 Tab 2    Omega-3-DHA-EPA-Fish Oil 1,000 mg (120 mg-180 mg) cap Take 2,000 mg by mouth daily.  cycloSPORINE (RESTASIS) 0.05 % ophthalmic emulsion Administer 1 Drop to both eyes two (2) times a day.  albuterol (PROVENTIL HFA, VENTOLIN HFA, PROAIR HFA) 90 mcg/actuation inhaler Take 2 Puffs by inhalation every six (6) hours as needed for Wheezing or Shortness of Breath. 1 Inhaler 3     No current facility-administered medications on file prior to visit. Allergies   Allergen Reactions    Amoxicillin Rash    Sulfa (Sulfonamide Antibiotics) Rash    Tolectin [Tolmetin] Rash       Objective:  Visit Vitals    /80 (BP 1 Location: Left arm, BP Patient Position: Sitting)    Pulse 86    Temp 98.6 °F (37 °C) (Oral)    Resp 20    Ht 5' 6\" (1.676 m)    Wt 229 lb (103.9 kg)    SpO2 95%    BMI 36.96 kg/m2      Constitutional: Well developed, nourished, no distress, alert, obese habitus   CV: S1, S2.  RRR. No murmurs/rubs. No thrills palpated. No carotid bruits. Intact distal pulses. No edema. Pulm: No abnormalities on inspection. Clear to auscultation bilaterally. No wheezing/rhonchi. Normal effort. GI: Soft, diffusely tender, no rebound or guarding, nondistended. Normal active bowel sounds.   No obvious umbilical hernia recurrence   Psych: constricted affect, appropriate judgement

## 2018-05-21 NOTE — MR AVS SNAPSHOT
[address] [place] [address]  [address] 7519 [facility] [address] [phone] Patient: Chang Foreman MRN: [id number] EXV:[id number] Visit Information Date & Time Provider Department Dept. Phone Encounter #  
 5/21/2018 10:15 AM Cirilo Hodges, 3 [facility] [phone] Upcoming Health Maintenance Date Due Influenza Age 5 to Adult 8/1/2018 COLON CANCER SCRN (BARIUM / CT COLO / FLX SIG) Q5 1/1/2019 Pneumococcal 19-64 Highest Risk (2 of 3 - PCV13) 2/20/2019 BREAST CANCER SCRN MAMMOGRAM 1/25/2020 PAP AKA CERVICAL CYTOLOGY 5/17/2020 DTaP/Tdap/Td series (2 - Td) 7/3/2024 Allergies as of 5/21/2018  Review Complete On: 5/21/2018 By: Cirilo Hodges MD  
  
 Severity Noted Reaction Type Reactions Amoxicillin  10/06/2015    Rash  
 Sulfa (Sulfonamide Antibiotics)  10/06/2015    Rash Tolectin [Tolmetin]  10/06/2015    Rash Current Immunizations  Reviewed on 10/6/2015 Name Date Influenza Vaccine 11/7/2013 Tdap 7/3/2014 Not reviewed this visit You Were Diagnosed With   
  
 Codes Comments Increased abdominal girth    -  Primary ICD-10-CM: R19.8 ICD-9-CM: 789.30 Periumbilical pain     XTN-21-LI: R10.33 ICD-9-CM: 789.05   
 RAD (reactive airway disease), mild intermittent, uncomplicated     RDA-25-CA: J45.20 ICD-9-CM: 493.90 Anxiety and depression     ICD-10-CM: F41.9, F32.9 ICD-9-CM: 300.00, 311 Scalp itch     ICD-10-CM: L29.9 ICD-9-CM: 698.9 Screen for colon cancer     ICD-10-CM: Z12.11 ICD-9-CM: V76.51 Vitals BP Pulse Temp Resp Height(growth percentile) Weight(growth percentile) 112/80 (BP 1 Location: Left arm, BP Patient Position: Sitting) 86 98.6 °F (37 °C) (Oral) 20 5' 6\" (1.676 m) 229 lb (103.9 kg) SpO2 BMI OB Status Smoking Status 95% 36.96 kg/m2 Ablation Never Smoker Vitals History BMI and BSA Data Body Mass Index Body Surface Area  
 36.96 kg/m 2 2.2 m 2 Preferred Pharmacy Pharmacy Name Phone [phone] Your Updated Medication List  
  
   
This list is accurate as of 5/21/18 10:49 AM.  Always use your most recent med list.  
  
  
  
  
 albuterol 90 mcg/actuation inhaler Commonly known as:  PROVENTIL HFA, VENTOLIN HFA, PROAIR HFA Take 2 Puffs by inhalation every six (6) hours as needed for Wheezing or Shortness of Breath. clobetasol 0.05 % Sham  
Apply to scalp and leave in place for 5 minutes before rinsing. Use every other day for one week. Desvenlafaxine 100 mg Tb24 Take 1 Tab by mouth daily. LORazepam 1 mg tablet Commonly known as:  ATIVAN Take 1 Tab by mouth two (2) times daily as needed for Anxiety. Max Daily Amount: 2 mg.  
  
 omega 3-DHA-EPA-fish oil 1,000 mg (120 mg-180 mg) capsule Take 2,000 mg by mouth daily. omeprazole 40 mg capsule Commonly known as:  PRILOSEC Take 1 Cap by mouth daily. REFRESH OPTIVE 0.5-0.9 % Drop Generic drug:  Carboxymethylcellulose-Glycern Apply  to eye.  
  
 traZODone 100 mg tablet Commonly known as:  DESYREL  
TAKE TWO TABLETS BY MOUTH EVERY NIGHT AS NEEDED FOR SLEEP  
  
 VITAMIN B-12 1,000 mcg tablet Generic drug:  cyanocobalamin Take 1,000 mcg by mouth daily. ZOLMitriptan 2.5 mg tablet Commonly known as:  ZOMIG Take 1 Tab by mouth as needed for Migraine. Prescriptions Printed Refills LORazepam (ATIVAN) 1 mg tablet 1 Sig: Take 1 Tab by mouth two (2) times daily as needed for Anxiety. Max Daily Amount: 2 mg. Class: Print Route: Oral  
  
Prescriptions Sent to Pharmacy Refills  
 albuterol (PROVENTIL HFA, VENTOLIN HFA, PROAIR HFA) 90 mcg/actuation inhaler 3 Sig: Take 2 Puffs by inhalation every six (6) hours as needed for Wheezing or Shortness of Breath.   
 Class: Normal  
 Pharmacy: [address] [place] [address], MalickJody Mabry 57 29 Ph #: [phone] Route: Inhalation  
 clobetasol 0.05 % sham 0 Sig: Apply to scalp and leave in place for 5 minutes before rinsing. Use every other day for one week. Class: Normal  
 Pharmacy: [facility] [phone] Ph #: [phone] We Performed the Following REFERRAL TO GASTROENTEROLOGY [TYI24 Custom] Comments:  
 Please evaluate patient for colon cancer screening. To-Do List   
 05/21/2018 Imaging:  CT ABD PELV W WO CONT Referral Information Referral ID Referred By Referred To  
  
 [id number] [facility] [address] [place], [address] [facility] [address] Phone: [phone] Fax: [phone] Visits Status Start Date End Date 1 New Request 5/21/18 5/21/19 If your referral has a status of pending review or denied, additional information will be sent to support the outcome of this decision. Introducing [facility] & HEALTH SERVICES! [employer] introduces FlyClip patient portal. Now you can access parts of your medical record, email your doctor's office, and request medication refills online. 1. In your internet browser, go to [email or web address] 2. Click on the First Time User? Click Here link in the Sign In box. You will see the New Member Sign Up page. 3. Enter your FlyClip Access Code exactly as it appears below. You will not need to use this code after youve completed the sign-up process. If you do not sign up before the expiration date, you must request a new code. · FlyClip Access Code: [email or web address] Expires: 8/19/2018 10:49 AM 
 
4. Enter the last four digits of your Social Security Number (xxxx) and Date of Birth (mm/dd/yyyy) as indicated and click Submit. You will be taken to the next sign-up page. 5. Create a Fantastic.cl ID. This will be your Fantastic.cl login ID and cannot be changed, so think of one that is secure and easy to remember. 6. Create a Fantastic.cl password. You can change your password at any time. 7. Enter your Password Reset Question and Answer. This can be used at a later time if you forget your password. 8. Enter your e-mail address. You will receive e-mail notification when new information is available in [address] 9. Click Sign Up. You can now view and download portions of your medical record. 10. Click the Download Summary menu link to download a portable copy of your medical information. If you have questions, please visit the Frequently Asked Questions section of the [email or web address] website. Remember, [email or web address].cl is NOT to be used for urgent needs. For medical emergencies, dial 911. Now available from your iPhone and Android! Please provide this summary of care documentation to your next provider. Your primary care clinician is listed as Tamiko 13. If you have any questions after today's visit, please call [phone].

## 2018-06-08 ENCOUNTER — TELEPHONE (OUTPATIENT)
Dept: FAMILY MEDICINE CLINIC | Age: 63
End: 2018-06-08

## 2018-06-08 DIAGNOSIS — R10.33 PERIUMBILICAL PAIN: ICD-10-CM

## 2018-06-08 DIAGNOSIS — R19.8 INCREASED ABDOMINAL GIRTH: Primary | ICD-10-CM

## 2018-06-08 NOTE — TELEPHONE ENCOUNTER
[facility] scheduling is requesting to have the CT order changed. Order needs to be W/WO contrast  Procedure codes have to be [phone] or [phone] it can not be the one that is on to order. Insurance will not cover the order.       (f) [phone] Tia Chery

## 2018-06-27 ENCOUNTER — TELEPHONE (OUTPATIENT)
Dept: FAMILY MEDICINE CLINIC | Age: 63
End: 2018-06-27

## 2018-06-27 DIAGNOSIS — F41.9 ANXIETY AND DEPRESSION: Chronic | ICD-10-CM

## 2018-06-27 DIAGNOSIS — F51.04 PSYCHOPHYSIOLOGICAL INSOMNIA: ICD-10-CM

## 2018-06-27 DIAGNOSIS — F32.A ANXIETY AND DEPRESSION: Chronic | ICD-10-CM

## 2018-06-27 RX ORDER — OMEPRAZOLE 40 MG/1
40 CAPSULE, DELAYED RELEASE ORAL DAILY
Qty: 90 CAP | Refills: 3 | Status: SHIPPED | OUTPATIENT
Start: 2018-06-27 | End: 2018-10-30 | Stop reason: SDUPTHER

## 2018-06-27 RX ORDER — TRAZODONE HYDROCHLORIDE 100 MG/1
TABLET ORAL
Qty: 180 TAB | Refills: 2 | Status: SHIPPED | OUTPATIENT
Start: 2018-06-27 | End: 2018-08-20 | Stop reason: ALTCHOICE

## 2018-06-27 RX ORDER — DESVENLAFAXINE 100 MG/1
100 TABLET, EXTENDED RELEASE ORAL DAILY
Qty: 90 TAB | Refills: 0 | Status: SHIPPED | OUTPATIENT
Start: 2018-06-27 | End: 2018-06-28 | Stop reason: SDUPTHER

## 2018-06-27 NOTE — TELEPHONE ENCOUNTER
Pt called in about refills and asked about a referral for her neck pain. Has had neck pain x 6 weeks. Asked her rheum dr about it but he cannot help her. Also went to ED for chest pain. Scheduled next day appt.

## 2018-06-28 ENCOUNTER — OFFICE VISIT (OUTPATIENT)
Dept: FAMILY MEDICINE CLINIC | Age: 63
End: 2018-06-28

## 2018-06-28 VITALS
RESPIRATION RATE: 18 BRPM | SYSTOLIC BLOOD PRESSURE: 102 MMHG | WEIGHT: 227 LBS | DIASTOLIC BLOOD PRESSURE: 70 MMHG | HEIGHT: 66 IN | HEART RATE: 72 BPM | TEMPERATURE: 99 F | BODY MASS INDEX: 36.48 KG/M2 | OXYGEN SATURATION: 94 %

## 2018-06-28 DIAGNOSIS — F32.A ANXIETY AND DEPRESSION: Chronic | ICD-10-CM

## 2018-06-28 DIAGNOSIS — F41.9 ANXIETY AND DEPRESSION: Chronic | ICD-10-CM

## 2018-06-28 RX ORDER — DESVENLAFAXINE 100 MG/1
100 TABLET, EXTENDED RELEASE ORAL DAILY
Qty: 90 TAB | Refills: 1 | Status: SHIPPED | OUTPATIENT
Start: 2018-06-28 | End: 2018-08-20 | Stop reason: SDUPTHER

## 2018-06-28 RX ORDER — LETROZOLE 2.5 MG/1
2.5 TABLET, FILM COATED ORAL
COMMUNITY
End: 2018-08-20 | Stop reason: ALTCHOICE

## 2018-06-28 NOTE — MR AVS SNAPSHOT
[address] [facility] 
 
 
 [address] 2201 [facility] [address] [phone] Patient: Maite Woodward MRN: [id number] Visit Information Date & Time Provider Department Dept. Phone Encounter #  
 6/28/2018  1:45 PM Sujata Ag, [address] [place] [phone] [id number] Upcoming Health Maintenance Date Due Influenza Age 5 to Adult 8/1/2018 COLON CANCER SCRN (BARIUM / CT COLO / FLX SIG) Q5 1/1/2019 Pneumococcal 19-64 Highest Risk (2 of 3 - PCV13) 2/20/2019 BREAST CANCER SCRN MAMMOGRAM 1/25/2020 PAP AKA CERVICAL CYTOLOGY 5/17/2020 DTaP/Tdap/Td series (2 - Td) 7/3/2024 Allergies as of 6/28/2018  Review Complete On: 6/28/2018 By: Sujata Ag MD  
  
 Severity Noted Reaction Type Reactions Amoxicillin  10/06/2015    Rash  
 Sulfa (Sulfonamide Antibiotics)  10/06/2015    Rash Tolectin [Tolmetin]  10/06/2015    Rash Current Immunizations  Reviewed on 10/6/2015 Name Date Influenza Vaccine 11/7/2013 Tdap 7/3/2014 Not reviewed this visit You Were Diagnosed With   
  
 Codes Comments Anxiety and depression     ICD-10-CM: F41.9, F32.9 ICD-9-CM: 300.00, 311 Vitals BP Pulse Temp Resp Height(growth percentile) Weight(growth percentile) 102/70 (BP 1 Location: Right arm, BP Patient Position: Sitting) 72 99 °F (37.2 °C) (Oral) 18 5' 6\" (1.676 m) 227 lb (103 kg) SpO2 BMI OB Status Smoking Status 94% 36.64 kg/m2 Ablation Never Smoker Vitals History BMI and BSA Data Body Mass Index Body Surface Area  
 36.64 kg/m 2 2.19 m 2 Preferred Pharmacy Pharmacy Name Phone [address] [facility], [address] [facility] [place] [phone] Your Updated Medication List  
  
   
This list is accurate as of 6/28/18  2:20 PM.  Always use your most recent med list.  
  
  
  
  
 albuterol 90 mcg/actuation inhaler Commonly known as:  PROVENTIL HFA, VENTOLIN HFA, PROAIR HFA Take 2 Puffs by inhalation every six (6) hours as needed for Wheezing or Shortness of Breath. clobetasol 0.05 % Sham  
Apply to scalp and leave in place for 5 minutes before rinsing. Use every other day for one week. Desvenlafaxine 100 mg Tb24 Take 1 Tab by mouth daily. letrozole 2.5 mg tablet Commonly known as:  FEMARA  
2.5 mg. LORazepam 1 mg tablet Commonly known as:  ATIVAN Take 1 Tab by mouth two (2) times daily as needed for Anxiety. Max Daily Amount: 2 mg.  
  
 omega 3-DHA-EPA-fish oil 1,000 mg (120 mg-180 mg) capsule Take 2,000 mg by mouth daily. omeprazole 40 mg capsule Commonly known as:  PRILOSEC Take 1 Cap by mouth daily. REFRESH OPTIVE 0.5-0.9 % Drop Generic drug:  Carboxymethylcellulose-Glycern Apply  to eye.  
  
 traZODone 100 mg tablet Commonly known as:  DESYREL  
TAKE TWO TABLETS BY MOUTH EVERY NIGHT AS NEEDED FOR SLEEP  
  
 VITAMIN B-12 1,000 mcg tablet Generic drug:  cyanocobalamin Take 1,000 mcg by mouth daily. ZOLMitriptan 2.5 mg tablet Commonly known as:  ZOMIG Take 1 Tab by mouth as needed for Migraine. Prescriptions Sent to Pharmacy Refills Desvenlafaxine 100 mg Tb24 1 Sig: Take 1 Tab by mouth daily. Class: Normal  
 Pharmacy: [facility] [address], [address] #: [phone] Route: Oral  
  
Introducing [facility] & HEALTH SERVICES! Michaelle Chavez introduces Cardize patient portal. Now you can access parts of your medical record, email your doctor's office, and request medication refills online. 1. In your internet browser, go to [email or web address] 2. Click on the First Time User? Click Here link in the Sign In box. You will see the New Member Sign Up page. 3. Enter your LiveNinja Access Code exactly as it appears below. You will not need to use this code after youve completed the sign-up process. If you do not sign up before the expiration date, you must request a new code. · LiveNinja Access Code: [email or web address] Expires: 8/19/2018 10:49 AM 
 
4. Enter the last four digits of your Social Security Number (xxxx) and Date of Birth (mm/dd/yyyy) as indicated and click Submit. You will be taken to the next sign-up page. 5. Create a LiveNinja ID. This will be your LiveNinja login ID and cannot be changed, so think of one that is secure and easy to remember. 6. Create a LiveNinja password. You can change your password at any time. 7. Enter your Password Reset Question and Answer. This can be used at a later time if you forget your password. 8. Enter your e-mail address. You will receive e-mail notification when new information is available in [address] 9. Click Sign Up. You can now view and download portions of your medical record. 10. Click the Download Summary menu link to download a portable copy of your medical information. If you have questions, please visit the Frequently Asked Questions section of the LiveNinja website. Remember, LiveNinja is NOT to be used for urgent needs. For medical emergencies, dial 911. Now available from your iPhone and Android! Please provide this summary of care documentation to your next provider. Your primary care clinician is listed as Tamiko 13. If you have any questions after today's visit, please call [phone].

## 2018-06-28 NOTE — PROGRESS NOTES
Stevan Lopez is a 58 y.o. female (: 1955) presenting to address:    Chief Complaint   Patient presents with    ED Follow-up       Vitals:    18 1357   BP: 102/70   Pulse: 72   Resp: 18   Temp: 99 °F (37.2 °C)   TempSrc: Oral   SpO2: 94%   Weight: 227 lb (103 kg)   Height: 5' 6\" (1.676 m)   PainSc:   8   PainLoc: Generalized       Learning Assessment:     Learning Assessment 10/6/2015   PRIMARY LEARNER Patient   HIGHEST LEVEL OF EDUCATION - PRIMARY LEARNER  SOME COLLEGE   BARRIERS PRIMARY LEARNER NONE   CO-LEARNER CAREGIVER No   PRIMARY LANGUAGE ENGLISH    NEED No   LEARNER PREFERENCE PRIMARY READING   LEARNING SPECIAL TOPICS none   ANSWERED BY patient   RELATIONSHIP SELF   ASSESSMENT COMMENT n/a     Depression Screening:     PHQ over the last two weeks 2018   PHQ Not Done Active Diagnosis of Depression or Bipolar Disorder   Little interest or pleasure in doing things -   Feeling down, depressed or hopeless -   Total Score PHQ 2 -   Trouble falling or staying asleep, or sleeping too much -   Feeling tired or having little energy -   Poor appetite or overeating -   Feeling bad about yourself - or that you are a failure or have let yourself or your family down -   Trouble concentrating on things such as school, work, reading or watching TV -   Moving or speaking so slowly that other people could have noticed; or the opposite being so fidgety that others notice -   Thoughts of being better off dead, or hurting yourself in some way -   PHQ 9 Score -   How difficult have these problems made it for you to do your work, take care of your home and get along with others -     Fall Risk Assessment:     Fall Risk Assessment, last 12 mths 2018   Able to walk? Yes   Fall in past 12 months? Yes   Fall with injury? No   Number of falls in past 12 months 1   Fall Risk Score 1     Abuse Screening:     Abuse Screening Questionnaire 2018   Do you ever feel afraid of your partner?  N   Are you in a relationship with someone who physically or mentally threatens you? N   Is it safe for you to go home? Y     Coordination of Care Questionaire:   1. Have you been to the ER, urgent care clinic since your last visit? Hospitalized since your last visit? YES    2. Have you seen or consulted any other health care providers outside of the [address] [place] [address] since your last visit? Include any pap smears or colon screening. NO    Advanced Directive:   1. Do you have an Advanced Directive? YES    2. Would you like information on Advanced Directives?  NO

## 2018-06-28 NOTE — PROGRESS NOTES
Assessment/Plan:    1. Anxiety and depression  -resume pristiq. - Desvenlafaxine 100 mg Tb24; Take 1 Tab by mouth daily. Dispense: 90 Tab; Refill: 1    The plan was discussed with the patient. The patient verbalized understanding and is in agreement with the plan. All medication potential side effects were discussed with the patient. Health Maintenance:   Health Maintenance   Topic Date Due    Influenza Age 5 to Adult  08/01/2018   [facility] COLON CANCER SCRN (BARIUM / CT COLO / FLX SIG) Q5  01/01/2019    Pneumococcal 19-64 Highest Risk (2 of 3 - PCV13) 02/20/2019    BREAST CANCER SCRN MAMMOGRAM  01/25/2020    PAP AKA CERVICAL CYTOLOGY  05/17/2020    DTaP/Tdap/Td series (2 - Td) 07/03/2024    Hepatitis C Screening  Completed    ZOSTER VACCINE AGE 60>  Completed       Madeline Valdes is a 58 y.o. female and presents with ED Follow-up     Subjective:  Pt feels femara has been causing a side effect of anxiety. She had been out of pristiq for a week. Her pain level has increased (fibromyalgia)- both muscular and joint pain. She's also stressed b/c she's going through a separation. Has failed numerous agents for fibromyalgia. ROS:  Constitutional: No recent weight change. No weakness/fatigue. No f/c. Cardiovascular: No CP/palpitations. No FREDERICK/orthopnea/PND. Respiratory: No cough/sputum, dyspnea, wheezing. Musculoskeletal: + joint pain.  + muscle pain/tenderness. Psychiatric:  + depression, +anxiety. The problem list was updated as a part of today's visit.   Patient Active Problem List   Diagnosis Code    Insomnia G47.00    Fibromyalgia M79.7    Anxiety and depression F41.9, F32.9    Migraines G43.909    Irritable bowel syndrome K58.9    Ulcerative colitis ([facility] K51.90    Atopic rhinitis J30.9    Gastroesophageal reflux disease K21.9    Tear film insufficiency H04.129    Dry eye syndrome of both lacrimal glands H04.123    Pure hypercholesterolemia E78.00    Controlled substance agreement signed Z79.899    B12 deficiency E53.8    Infiltrating ductal carcinoma of left breast ([facility] C50.912    Severe obesity (BMI 35.0-39. 9) with comorbidity ([facility] E66.01       The PSH, FH were reviewed. SH:  Social History   Substance Use Topics    Smoking status: Never Smoker    Smokeless tobacco: Never Used    Alcohol use Yes      Comment: Every once in awhile       Medications/Allergies:  Current Outpatient Prescriptions on File Prior to Visit   Medication Sig Dispense Refill    omeprazole (PRILOSEC) 40 mg capsule Take 1 Cap by mouth daily. 90 Cap 3    traZODone (DESYREL) 100 mg tablet TAKE TWO TABLETS BY MOUTH EVERY NIGHT AS NEEDED FOR SLEEP 180 Tab 2    Desvenlafaxine 100 mg Tb24 Take 1 Tab by mouth daily. 90 Tab 0    albuterol (PROVENTIL HFA, VENTOLIN HFA, PROAIR HFA) 90 mcg/actuation inhaler Take 2 Puffs by inhalation every six (6) hours as needed for Wheezing or Shortness of Breath. 1 Inhaler 3    LORazepam (ATIVAN) 1 mg tablet Take 1 Tab by mouth two (2) times daily as needed for Anxiety. Max Daily Amount: 2 mg. 120 Tab 1    clobetasol 0.05 % sham Apply to scalp and leave in place for 5 minutes before rinsing. Use every other day for one week. 118 mL 0    Carboxymethylcellulose-Glycern (REFRESH OPTIVE) 0.5-0.9 % drop Apply  to eye.  cyanocobalamin (VITAMIN B-12) 1,000 mcg tablet Take 1,000 mcg by mouth daily.  ZOLMitriptan (ZOMIG) 2.5 mg tablet Take 1 Tab by mouth as needed for Migraine. 27 Tab 2    Omega-3-DHA-EPA-Fish Oil 1,000 mg (120 mg-180 mg) cap Take 2,000 mg by mouth daily. No current facility-administered medications on file prior to visit.          Allergies   Allergen Reactions    Amoxicillin Rash    Sulfa (Sulfonamide Antibiotics) Rash    Tolectin [Tolmetin] Rash       Objective:  Visit Vitals    /70 (BP 1 Location: Right arm, BP Patient Position: Sitting)    Pulse 72    Temp 99 °F (37.2 °C) (Oral)    Resp 18    Ht 5' 6\" (1.676 m)    Wt 227 lb (103 kg)    SpO2 94%    BMI 36.64 kg/m2      Constitutional: Well developed, nourished, no distress, alert, obese habitus   CV: S1, S2.  RRR. No murmurs/rubs. No thrills palpated. No carotid bruits. Intact distal pulses. No edema. Pulm: No abnormalities on inspection. Clear to auscultation bilaterally. No wheezing/rhonchi. Normal effort. Psych: Appropriate affect, judgement and insight. Short-term memory intact.

## 2018-07-16 ENCOUNTER — TELEPHONE (OUTPATIENT)
Dept: FAMILY MEDICINE CLINIC | Age: 63
End: 2018-07-16

## 2018-07-16 NOTE — TELEPHONE ENCOUNTER
Prior auth submitted for omeprazole. Request was routed to incorrect dr for prior auth previously. Pending decision.

## 2018-08-06 ENCOUNTER — TELEPHONE (OUTPATIENT)
Dept: FAMILY MEDICINE CLINIC | Age: 63
End: 2018-08-06

## 2018-08-06 NOTE — TELEPHONE ENCOUNTER
Pt called wanting to know can  write her up a letter so that she can have a dog  for her anxiety. Says she needs this letter in order to get a house she signed up for. Wanted to have an appointment before the 20th. Says she has been living in her car and in and out of her car  since the end of June. She knows Gwen Arrieta doesn't have any appointments coming up so she wants to know can another doctor access her and Dr. Regina Jackson write her a letter from their decision. Would like to be called to be informed doesn't know how to work her mychart.

## 2018-08-20 ENCOUNTER — OFFICE VISIT (OUTPATIENT)
Dept: FAMILY MEDICINE CLINIC | Age: 63
End: 2018-08-20

## 2018-08-20 VITALS
BODY MASS INDEX: 36 KG/M2 | SYSTOLIC BLOOD PRESSURE: 118 MMHG | OXYGEN SATURATION: 96 % | WEIGHT: 224 LBS | HEIGHT: 66 IN | DIASTOLIC BLOOD PRESSURE: 70 MMHG | RESPIRATION RATE: 18 BRPM | TEMPERATURE: 98.2 F | HEART RATE: 72 BPM

## 2018-08-20 DIAGNOSIS — F32.A ANXIETY AND DEPRESSION: Primary | Chronic | ICD-10-CM

## 2018-08-20 DIAGNOSIS — F51.04 PSYCHOPHYSIOLOGICAL INSOMNIA: ICD-10-CM

## 2018-08-20 DIAGNOSIS — F41.9 ANXIETY AND DEPRESSION: Primary | Chronic | ICD-10-CM

## 2018-08-20 RX ORDER — MELATONIN
DAILY
COMMUNITY

## 2018-08-20 RX ORDER — DESVENLAFAXINE 100 MG/1
100 TABLET, EXTENDED RELEASE ORAL DAILY
Qty: 90 TAB | Refills: 1 | Status: SHIPPED | OUTPATIENT
Start: 2018-08-20 | End: 2018-12-07 | Stop reason: SDUPTHER

## 2018-08-20 RX ORDER — BUPROPION HYDROCHLORIDE 150 MG/1
150 TABLET ORAL
Qty: 90 TAB | Refills: 0 | Status: SHIPPED | OUTPATIENT
Start: 2018-08-20 | End: 2019-02-11 | Stop reason: SDUPTHER

## 2018-08-20 RX ORDER — EXEMESTANE 25 MG/1
25 TABLET ORAL DAILY
COMMUNITY

## 2018-08-20 RX ORDER — CALCIUM CARBONATE 500(1250)
TABLET ORAL DAILY
COMMUNITY

## 2018-08-20 RX ORDER — LORAZEPAM 2 MG/1
2 TABLET ORAL
Qty: 90 TAB | Refills: 1 | Status: SHIPPED | OUTPATIENT
Start: 2018-08-20

## 2018-08-20 NOTE — PROGRESS NOTES
Assessment/Plan:    1. Anxiety and depression  -add wellbutrin. F/u in 1mo. - Desvenlafaxine 100 mg Tb24; Take 1 Tab by mouth daily. Dispense: 90 Tab; Refill: 1  - buPROPion XL (WELLBUTRIN XL) 150 mg tablet; Take 1 Tab by mouth every morning. Dispense: 90 Tab; Refill: 0    2. Psychophysiological insomnia  -stop trazodone as it is ineffective. Cont current regimen ativan. - LORazepam (ATIVAN) 2 mg tablet; Take 1 Tab by mouth nightly as needed for Anxiety. Max Daily Amount: 2 mg. Dispense: 90 Tab; Refill: 1  [facility]  reviewed and is in accordance with patient's prescriptions     A total of  minutes was spent with the patient of which 15 minutes was spent in counseling regarding 15    The plan was discussed with the patient. The patient verbalized understanding and is in agreement with the plan. All medication potential side effects were discussed with the patient. Health Maintenance:   Health Maintenance   Topic Date Due    Influenza Age 5 to Adult  08/01/2018   [address] [place] [address] COLON CANCER SCRN (BARIUM / CT COLO / FLX SIG) Q5  01/01/2019    Pneumococcal 19-64 Highest Risk (2 of 3 - PCV13) 02/20/2019    BREAST CANCER SCRN MAMMOGRAM  01/25/2020    PAP AKA CERVICAL CYTOLOGY  05/17/2020    DTaP/Tdap/Td series (2 - Td) 07/03/2024    Hepatitis C Screening  Completed    ZOSTER VACCINE AGE 60>  Completed       Shannan Cagle is a 61 y.o. female and presents with Medication Evaluation     Subjective:  Depression and anxiety - she is recently . She didn't have a place to live for 6 weeks. She now has a place to live. States she's not motivated to unpack. Not sure if it's related to aromasin for breast cancer, started 7/27. Using ativan sparingly. She is seeing a therapist next week. PHQ 9 indicates severe depression. On pristiq. Has met new people walking her dogs, which she feels is good. ROS:  Constitutional: No recent weight change. No weakness/fatigue. No f/c. Cardiovascular: No CP/palpitations. No FREDERICK/orthopnea/PND. Respiratory: No cough/sputum, dyspnea, wheezing. Gastointestinal: No dysphagia, reflux. No n/v. No constipation/diarrhea. No melena/rectal bleeding. Psychiatric:  + depression, +anxiety. The problem list was updated as a part of today's visit. Patient Active Problem List   Diagnosis Code    Insomnia G47.00    Fibromyalgia M79.7    Anxiety and depression F41.9, F32.9    Migraines G43.909    Irritable bowel syndrome K58.9    Ulcerative colitis ([facility] K51.90    Atopic rhinitis J30.9    Gastroesophageal reflux disease K21.9    Tear film insufficiency H04.129    Dry eye syndrome of both lacrimal glands H04.123    Pure hypercholesterolemia E78.00    Controlled substance agreement signed Z79.899    B12 deficiency E53.8    Infiltrating ductal carcinoma of left breast ([facility] C50.912    Severe obesity (BMI 35.0-39. 9) [facility] comorbidity ([facility] E66.01       The PSH, FH were reviewed. SH:  Social History   Substance Use Topics    Smoking status: Never Smoker    Smokeless tobacco: Never Used    Alcohol use Yes      Comment: Every once in awhile       Medications/Allergies:  Current Outpatient Prescriptions on File Prior to Visit   Medication Sig Dispense Refill    Desvenlafaxine 100 mg Tb24 Take 1 Tab by mouth daily. 90 Tab 1    omeprazole (PRILOSEC) 40 mg capsule Take 1 Cap by mouth daily. 90 Cap 3    traZODone (DESYREL) 100 mg tablet TAKE TWO TABLETS BY MOUTH EVERY NIGHT AS NEEDED FOR SLEEP 180 Tab 2    albuterol (PROVENTIL HFA, VENTOLIN HFA, PROAIR HFA) 90 mcg/actuation inhaler Take 2 Puffs by inhalation every six (6) hours as needed for Wheezing or Shortness of Breath. 1 Inhaler 3    LORazepam (ATIVAN) 1 mg tablet Take 1 Tab by mouth two (2) times daily as needed for Anxiety. Max Daily Amount: 2 mg. 120 Tab 1    clobetasol 0.05 % sham Apply to scalp and leave in place for 5 minutes before rinsing. Use every other day for one week.  118 mL 0    Carboxymethylcellulose-Glycern (REFRESH OPTIVE) 0.5-0.9 % drop Apply  to eye.  cyanocobalamin (VITAMIN B-12) 1,000 mcg tablet Take 1,000 mcg by mouth daily.  ZOLMitriptan (ZOMIG) 2.5 mg tablet Take 1 Tab by mouth as needed for Migraine. 27 Tab 2    Omega-3-DHA-EPA-Fish Oil 1,000 mg (120 mg-180 mg) cap Take 2,000 mg by mouth daily. No current facility-administered medications on file prior to visit. Allergies   Allergen Reactions    Amoxicillin Rash    Sulfa (Sulfonamide Antibiotics) Rash    Tolectin [Tolmetin] Rash       Objective:  Visit Vitals    /70 (BP 1 Location: Right arm, BP Patient Position: Sitting)    Pulse 72    Temp 98.2 °F (36.8 °C) (Oral)    Resp 18    Ht 5' 6\" (1.676 m)    Wt 224 lb (101.6 kg)    SpO2 96%    BMI 36.15 kg/m2      Constitutional: Well developed, nourished, no distress, alert, obese habitus   Psych: Appropriate affect, judgement and insight. Short-term memory intact.        PHQ over the last two weeks 8/20/2018   PHQ Not Done Active Diagnosis of Depression or Bipolar Disorder   Little interest or pleasure in doing things Nearly every day   Feeling down, depressed, irritable, or hopeless Nearly every day   Total Score PHQ 2 6   Trouble falling or staying asleep, or sleeping too much Nearly every day   Feeling tired or having little energy Nearly every day   Poor appetite, weight loss, or overeating More than half the days   Feeling bad about yourself - or that you are a failure or have let yourself or your family down Not at all   Trouble concentrating on things such as school, work, reading, or watching TV Nearly every day   Moving or speaking so slowly that other people could have noticed; or the opposite being so fidgety that others notice Not at all   Thoughts of being better off dead, or hurting yourself in some way Not at all   PHQ 9 Score 17   How difficult have these problems made it for you to do your work, take care of your home and get along with others Very difficult

## 2018-08-20 NOTE — MR AVS SNAPSHOT
[address]  [address] [facility] [address] 
[phone] Patient: Vinay Desai MRN: [id number] VNO:[id number] Visit Information Date & Time Provider Department Dept. Phone Encounter #  
 8/20/2018  1:15 PM Kori Butt, 3 [facility] [phone] [id number] Upcoming Health Maintenance Date Due Influenza Age 5 to Adult 8/1/2018 COLON CANCER SCRN (BARIUM / CT COLO / FLX SIG) Q5 1/1/2019 Pneumococcal 19-64 Highest Risk (2 of 3 - PCV13) 2/20/2019 BREAST CANCER SCRN MAMMOGRAM 1/25/2020 PAP AKA CERVICAL CYTOLOGY 5/17/2020 DTaP/Tdap/Td series (2 - Td) 7/3/2024 Allergies as of 8/20/2018  Review Complete On: 8/20/2018 By: Kori Butt MD  
  
 Severity Noted Reaction Type Reactions Amoxicillin  10/06/2015    Rash  
 Sulfa (Sulfonamide Antibiotics)  10/06/2015    Rash Tolectin [Tolmetin]  10/06/2015    Rash Current Immunizations  Reviewed on 10/6/2015 Name Date Influenza Vaccine 11/7/2013 Tdap 7/3/2014 Not reviewed this visit You Were Diagnosed With   
  
 Codes Comments Anxiety and depression    -  Primary ICD-10-CM: F41.9, F32.9 ICD-9-CM: 300.00, 311 Psychophysiological insomnia     ICD-10-CM: F51.04 
ICD-9-CM: 307.42 Vitals BP Pulse Temp Resp Height(growth percentile) Weight(growth percentile) 118/70 (BP 1 Location: Right arm, BP Patient Position: Sitting) 72 98.2 °F (36.8 °C) (Oral) 18 5' 6\" (1.676 m) 224 lb (101.6 kg) SpO2 BMI OB Status Smoking Status 96% 36.15 kg/m2 Ablation Never Smoker Vitals History BMI and BSA Data Body Mass Index Body Surface Area  
 36.15 kg/m 2 2.18 m 2 Preferred Pharmacy Pharmacy Name Phone [address] [facility] [phone] Your Updated Medication List  
  
   
 This list is accurate as of 8/20/18  1:34 PM.  Always use your most recent med list.  
  
  
  
  
 albuterol 90 mcg/actuation inhaler Commonly known as:  PROVENTIL HFA, VENTOLIN HFA, PROAIR HFA Take 2 Puffs by inhalation every six (6) hours as needed for Wheezing or Shortness of Breath. buPROPion  mg tablet Commonly known as:  Genene Emanuel Take 1 Tab by mouth every morning. calcium carbonate 500 mg calcium (1,250 mg) tablet Commonly known as:  OS-DANIELLE Take  by mouth daily. clobetasol 0.05 % Sham  
Apply to scalp and leave in place for 5 minutes before rinsing. Use every other day for one week. Desvenlafaxine 100 mg Tb24 Take 1 Tab by mouth daily. exemestane 25 mg tablet Commonly known as:  Elizabeth Mussel Take 25 mg by mouth daily. LORazepam 2 mg tablet Commonly known as:  ATIVAN Take 1 Tab by mouth nightly as needed for Anxiety. Max Daily Amount: 2 mg.  
  
 omega 3-DHA-EPA-fish oil 1,000 mg (120 mg-180 mg) capsule Take 2,000 mg by mouth daily. omeprazole 40 mg capsule Commonly known as:  PRILOSEC Take 1 Cap by mouth daily. REFRESH OPTIVE 0.5-0.9 % Drop Generic drug:  Carboxymethylcellulose-Glycern Apply  to eye. VITAMIN B-12 1,000 mcg tablet Generic drug:  cyanocobalamin Take 1,000 mcg by mouth daily. VITAMIN D3 1,000 unit tablet Generic drug:  cholecalciferol Take  by mouth daily. ZOLMitriptan 2.5 mg tablet Commonly known as:  ZOMIG Take 1 Tab by mouth as needed for Migraine. Prescriptions Printed Refills LORazepam (ATIVAN) 2 mg tablet 1 Sig: Take 1 Tab by mouth nightly as needed for Anxiety. Max Daily Amount: 2 mg. Class: Print Route: Oral  
  
Prescriptions Sent to Pharmacy Refills Desvenlafaxine 100 mg Tb24 1 Sig: Take 1 Tab by mouth daily. Class: Normal  
 Pharmacy: [facility] [address]  #: [phone] Route: Oral  
 buPROPion XL (WELLBUTRIN XL) 150 mg tablet 0 Sig: Take 1 Tab by mouth every morning. Class: Normal  
 Pharmacy: [facility] [address]  #: [phone] Route: Oral  
  
Introducing [facility]! Dear Blas Gonzalez: Thank you for requesting a Screenleap account. Our records indicate that you already have an active Screenleap account. You can access your account anytime at [email or web address]. [email or web address] Did you know that you can access your hospital and ER discharge instructions at any time in Screenleap? You can also review all of your test results from your hospital stay or ER visit. Additional Information If you have questions, please visit the Frequently Asked Questions section of the Screenleap website at [email or web address] Remember, Screenleap is NOT to be used for urgent needs. For medical emergencies, dial 911. Now available from your iPhone and Android! Please provide this summary of care documentation to your next provider. Your primary care clinician is listed as Tamiko Landin. If you have any questions after today's visit, please call [phone].

## 2018-08-20 NOTE — PROGRESS NOTES
Vinay Desai is a 61 y.o. female (: 1955) presenting to address:    Chief Complaint   Patient presents with    Medication Evaluation       Vitals:    18 1311   BP: 118/70   Pulse: 72   Resp: 18   Temp: 98.2 °F (36.8 °C)   TempSrc: Oral   SpO2: 96%   Weight: 224 lb (101.6 kg)   Height: 5' 6\" (1.676 m)   PainSc:   7   PainLoc: Generalized       Learning Assessment:     Learning Assessment 10/6/2015   PRIMARY LEARNER Patient   HIGHEST LEVEL OF EDUCATION - PRIMARY LEARNER  SOME COLLEGE   BARRIERS PRIMARY LEARNER NONE   CO-LEARNER CAREGIVER No   PRIMARY LANGUAGE ENGLISH    NEED No   LEARNER PREFERENCE PRIMARY READING   LEARNING SPECIAL TOPICS none   ANSWERED BY patient   RELATIONSHIP SELF   ASSESSMENT COMMENT n/a     Depression Screening:     PHQ over the last two weeks 2018   PHQ Not Done Active Diagnosis of Depression or Bipolar Disorder   Little interest or pleasure in doing things -   Feeling down, depressed, irritable, or hopeless -   Total Score PHQ 2 -   Trouble falling or staying asleep, or sleeping too much -   Feeling tired or having little energy -   Poor appetite, weight loss, or overeating -   Feeling bad about yourself - or that you are a failure or have let yourself or your family down -   Trouble concentrating on things such as school, work, reading, or watching TV -   Moving or speaking so slowly that other people could have noticed; or the opposite being so fidgety that others notice -   Thoughts of being better off dead, or hurting yourself in some way -   PHQ 9 Score -   How difficult have these problems made it for you to do your work, take care of your home and get along with others -     Fall Risk Assessment:     Fall Risk Assessment, last 12 mths 2018   Able to walk? Yes   Fall in past 12 months? Yes   Fall with injury?  No   Number of falls in past 12 months 1   Fall Risk Score 1     Abuse Screening:     Abuse Screening Questionnaire 2018   Do you ever feel afraid of your partner? N   Are you in a relationship with someone who physically or mentally threatens you? N   Is it safe for you to go home? Y     Coordination of Care Questionaire:   1. Have you been to the ER, urgent care clinic since your last visit? Hospitalized since your last visit? NO    2. Have you seen or consulted any other health care providers outside of the [facility] since your last visit? Include any pap smears or colon screening. NO    Advanced Directive:   1. Do you have an Advanced Directive? YES    2. Would you like information on Advanced Directives?  NO

## 2018-10-08 NOTE — PROGRESS NOTES
Carmelo Vazquez JR

 Created on:2018



Patient:JR Vazquez Lloyd R

Sex:Male

:1932

External Reference #:[id number]





Demographics







 Address  [address] [place] [address]

 

 Home Phone  [phone]

 

 Preferred Language  English

 

 Marital Status  Not  Or 

 

 Zoroastrianism Affiliation  Unknown

 

 Race  White

 

 Ethnic Group  Not  Or 









Author







 Organization  [facility]

 

 Address  [address]



   [place] [address]

 

 Phone  [phone]









Support







 Name  Relationship  Address  Phone

 

 Catherine Vazquez  Unavailable  Unavailable  Unavailable









Care Team Providers







 Name  Role  Phone

 

 Cholo Boo MD  Primary Care Physician  Unavailable









Payers







 Type  Date  Identification Numbers  Payment Provider  Subscriber

 

 Medicare Primary  Effective:  Policy Number:  Medicare  Carmelo Vazquez JR



   1999  [id number]    









 PayID: [id number]  [address]









 [place] [address]









 Medigap Part B  Effective:  Policy Number:  [employer]  Carmelo Vazquez,



   1991  [id number]    









 PayID: [id number]  [address]









 [place] [address]









 Medigap Part B  Effective: 1998  Policy Number:   Paris MENDEZ  Catherine Vazquez



     [id number]    









 Expires: 2017  Group Number: [id number]  [address] [address]









 PayID: [id number]  [place] [address]









 Commercial  Effective: 12/15/2016  Policy Number:  [employer]  Carmelo Vazquez JR



     [id number]    









 Expires: 10/23/2018  Group Number: [id number]  [id number] 









 PayID: [id number]  [address] [address]







Problems







 Date  Description  Provider  Status

 

 Onset: 2017  Aortic valve disorder  Tay Corcoran M.D., KIMBER,  Active



     FSCAI  

 

 Onset: 2017  Athscl heart disease of native  Tay Corcoran M.D., KIMBER,  
Active



   coronary artery w/o ang pctrs  FSCAI  

 

 Onset: 2017  Essential hypertension  Tay Corcoran M.D., KIMBER,  Active



     FSCAI  

 

 Onset: 2017  Hyperlipidemia  Tay Corcoran M.D., [facility],  Active



     FSCAI  

 

 Onset: 2017  Right bundle branch block  Tay Corcoran M.D., [facility],  Active



     FSCAI  

 

 Onset: 2017  Paroxysmal atrial fibrillation  Tay Corcoran M.D., [facility],  
Active



     FSCAI  

 

 Onset: 2017  Chronic kidney disease stage 3  Tay Corcoran M.D., [facility],  
Active



     FSCAI  

 

 Onset: 2018  Syncope and collapse  Tay Corcoran M.D., [facility],  Active



     FSCAI  

 

 Onset: 2018  Pneumonia  Nathan Grande, N.P.  Active

 

 Onset: 2018  Heart failure, unspecified  Nathan Grande, N.P.  Active

 

 Onset: 2018  Hyp hrt & chr kdny dis w hrt fail  Nathan Grande, N.P.  
Active



   and stg 1-4/unsp chr kdny    

 

 Onset: 2018  Chronic kidney disease  Nathan Grande, N.P.  Active

 

 Onset: 2018  Sepsis, unspecified organism  Eze Baltazar MD  Active

 

 Onset: 2018  Chronic diastolic heart failure  Eze Baltazar MD  Active

 

 Onset: 2018  Acute on chronic systolic heart  [facility]  
Active



   failure    

 

 Onset: 2018  Atrial fibrillation  [facility]  Active

 

 Onset: 2018  Acute subendocardial infarction  Natalia Ruvalcaba M.D.,[facility]ACLUAN  

 

 Onset: 2018  Anemia  Natalia Ruvalcaba M.D.,[facility]  







Family History







 Date  Family Member(s)  Problem(s)  Comments

 

   General  Arthritis  







Social History







 Type  Date  Description  Comments

 

 Marital Status      

 

 Occupation    Retired  

 

 ETOH Use    Drinks Alcoholic Beverages Rarely  once a year

 

 Smoking    Patient has never smoked  

 

 Recreational Drug Use    Denies Drug Use  

 

 Daily Caffeine    Does Not Consume Caffeine  

 

 Exercise Type/Frequency    Exercises rarely  







Allergies, Adverse Reactions, Alerts







 Date  Description  Reaction  Status  Severity  Comments

 

 2017  NKDA    active    







Medications







 Medication  Date  Status  Form  Strength  Qnty  SIG  Indications  Ordering



                 Provider

 

 Compression    Active  Misc    2unit  wear on  R60.9  Aura S.



 Stockings  /2018        s  both legs    Foster,



             daily and    N.P.



             remove at    



             night    

 

 Metamucil    Active  Capsules  0.52gm    1 cap by    Unknown



   /2017          mouth twice    



             a day with    



             water    

 

 Lipitor    Active  Tablets  10mg  90tab  1 by mouth            s  every night    Nilo,



             at bedtime    M.D.,



                 [facility],



                 [facility]

 

 Metoprolol    Active  Tablets  37.5mg    one ta bid    Tay



 Tar              BEHZAD Corcoran,



                 [facility],



                 [facility]

 

 Aspirin Adult Low    Active  Tablets  81mg    1 by mouth    Unknown



 Dose Ec      DR      every day    

 

 Omeprazole    Active  Capsules  20mg    1 by mouth    Unknown



       DR      every day    

 

 Potassium Chloride    Active  Tablets  10Meq    1 by mouth    Unknown



 ER      ER      every    



             day(taking    



             1/2 tab    



             Am,1/2 tab    



             PM)    

 

 Magnesium Oxide    Active  Tablets  400mg    2 by mouth    Unknown



             every day    

 

 Hydroxychloroquine    Active  Tablets  200mg  180ta  take 2    Davey



 Sulfate          bs  tablet    Gloria,



             daily    M.D.

 

 Vitamin D-3    Active  Capsules  1000Unit    1 by mouth    Unknown



   /0000          every day    

 

 Furosemide    Active  Tablets  40mg    1 tab by    Unknown



   /0000          mouth every    



             day    

 

                 

 

 Keflex    Hx  Capsules  250mg  9caps  3 times a              day for 3    D. Bienvenido,



   -          days    M.D.



                 

 

 Benzonatate    Hx  Capsules  100mg  45cap  1 capsule 3            s  times daily    DMaureen Faria,



   -          as needed    M.D.



                 

 

 Cozaar    Hx  Tablets  25mg    1 by mouth              twice    Nilo,



   -          daily(pt    M.DMaureen,



             unsure if    [facility]          he is    [facility]



             taking    



             this)    

 

 Multi Complete    Hx  Capsules      daily    Unknown



   /2017              



   -              



                 

 

 Potassium Chloride    Hx  Solution  20Meq/50M    twice daily    Unknown



   /2017      L        



   -              



                 

 

 Digoxin    Hx  Tablets  125mcg  30tab  1 by mouth            s  every day    Ben Corcoran M.D.,



                 [facility]              [facility]

 

 Cozaar    Hx  Tablets  50mg  30tab  2 by mouth            s  every day    Ben Corcoran M.D.,



                 [facility],



                 [facility]

 

 Lasix    Hx  Tablets  40mg    1 by mouth    Unknown



   /0000          every day    



   -              



                 

 

 Norco  00  Hx  Tablets  5-325mg    one to two    Unknown



   /0000          tabs by    



   -          mouth every    



             4-6 hours    



   /          as needed    



             pain    

 

 Coumadin    Hx  Tablets  1mg    take as    Unknown



   /0000          directed    



   -              



                 

 

 Vitamin D    Hx  Tablets  1000Unit    by mouth    Unknown



   /0000          everyday    



   -              



                 

 

 Digoxin    Hx  Tablets  125mcg    1 by mouth    Unknown



   /0000          every day    



   -              



                 

 

 Montelukast Sodium    Hx  Tablets  10mg    1 by mouth    Unknown



   /0000          every day    



   -          as needed    



   10/04              



   /2018              

 

 Sod Citrate-Citric    Hx  Solution  500-334mg    30 ml's 3    Unknown



 Acid  /0000      /5ML    times a    



   -          day.    



                 

 

 Gabapentin    Hx  Capsules  100mg    take 1    Unknown



   /0000          three times    



   -          a day    



   10/04              



   /2018              

 

 Prednisone    Hx  Tablets  10mg  60tab  Please take    Davey



   /        s  one tab by    Gloria,



   -          mouth once    M.D.



   08/21          daily    



   /2018              

 

 Torsemide    Hx  Tablets  20mg    take 2    Unknown



   /0000          tablets Up    



   -          To Once A    



             Day as    



   /2018          Needed For    



             Congestive    



             Heart    



             Failure    







Vital Signs







 Date  Vital  Result  Comment

 

 10/05/2018  Height  66 inches  5'6"









 Weight  152.50 lb  

 

 Heart Rate  80 /min  

 

 BP Systolic Sitting  110 mmHg  lue reg cuff

 

 BP Diastolic Sitting  54 mmHg  lue reg cuff

 

 Respiratory Rate  24 /min  

 

 BMI (Body Mass Index)  24.6 kg/m2  

 

 Ejection Fraction  35-40%  echo 2018  Height  66 inches  5'6"









 Weight  171.00 lb  

 

 Heart Rate  60 /min  

 

 BP Systolic Sitting  128 mmHg  

 

 BP Diastolic Sitting  62 mmHg  

 

 Respiratory Rate  14 /min  

 

 Body Temperature  97.9 F  

 

 O2 % BldC Oximetry  98 %  

 

 BMI (Body Mass Index)  27.6 kg/m2  









 2018  Height  66 inches  5'6"









 Weight  171.12 lb  

 

 Heart Rate  64 /min  

 

 BP Systolic  128 mmHg  

 

 BP Diastolic  62 mmHg  

 

 Pain Level  8  

 

 O2 % BldC Oximetry  98 %  

 

 BMI (Body Mass Index)  27.6 kg/m2  









 2018  Height  66 inches  5'6"









 Weight  163.00 lb  w/ shoes

 

 Heart Rate  68 /min  

 

 BP Systolic Sitting  124 mmHg  lue regg cuff

 

 BP Diastolic Sitting  54 mmHg  lue regg cuff

 

 BP Systolic Standing  136 mmHg  lue regg cuff

 

 BP Diastolic Standing  54 mmHg  lue regg cuff

 

 Respiratory Rate  24 /min  

 

 BMI (Body Mass Index)  26.3 kg/m2  

 

 Ejection Fraction  45-50%  Echo 2018  Height  66 inches  5'6"









 Weight  161.00 lb  with shoes

 

 Heart Rate  64 /min  

 

 BP Systolic Sitting  122 mmHg  Lue reg cuff

 

 BP Diastolic Sitting  50 mmHg  Lue reg cuff

 

 BP Systolic Standing  126 mmHg  Lue reg cuff

 

 BP Diastolic Standing  54 mmHg  Lue reg cuff

 

 Respiratory Rate  16 /min  

 

 BMI (Body Mass Index)  26.0 kg/m2  

 

 Ejection Fraction  45-50%  date 18 ECHO









 2018  Height  66 inches  5'6"









 Weight  159.50 lb  

 

 Heart Rate  63 /min  

 

 BP Systolic Sitting  120 mmHg  

 

 BP Diastolic Sitting  55 mmHg  

 

 Respiratory Rate  14 /min  

 

 Pain Level  5  

 

 BMI (Body Mass Index)  25.7 kg/m2  









 2018  Height  66 inches  5'6"









 Weight  155.00 lb  at home w/o clothes

 

 Heart Rate  66 /min  

 

 BP Systolic Sitting  110 mmHg  lue rg cuff

 

 BP Diastolic Sitting  46 mmHg  lue rg cuff

 

 Respiratory Rate  18 /min  

 

 BMI (Body Mass Index)  25.0 kg/m2  

 

 Ejection Fraction  50-55%  echo 2018  Height  66 inches  5'6"









 Weight  156.00 lb  w/ shoes

 

 Heart Rate  56 /min  

 

 BP Systolic Standing  115 mmHg  lue reg cuff

 

 BP Diastolic Standing  50 mmHg  lue reg cuff

 

 Respiratory Rate  18 /min  

 

 BMI (Body Mass Index)  25.2 kg/m2  

 

 Ejection Fraction  50-55%  echo 2017  Height  66 inches  5'6"









 Weight  165.00 lb  with shoes

 

 Heart Rate  60 /min  sit and 68 stand HR

 

 BP Systolic Sitting  146 mmHg  Rue reg cuff

 

 BP Diastolic Sitting  60 mmHg  Rue reg cuff

 

 BP Systolic Standing  152 mmHg  Rue reg cuff

 

 BP Diastolic Standing  80 mmHg  Rue reg cuff

 

 Respiratory Rate  17 /min  

 

 BMI (Body Mass Index)  26.6 kg/m2  









 2017  Height  66 inches  5'6"









 Weight  164.50 lb  with shoes

 

 Heart Rate  62 /min  

 

 BP Systolic Sitting  108 mmHg  LA reg cuff

 

 BP Diastolic Sitting  58 mmHg  LA reg cuff

 

 BP Systolic Standing  112 mmHg  LA reg cuff

 

 BP Diastolic Standing  62 mmHg  LA reg cuff

 

 BMI (Body Mass Index)  26.5 kg/m2  

 

 Ejection Fraction  60%  echo 17







Results







 Test  Date  Test  Result  H/L  Range  Note

 

 Comp Metabolic Panel  2018  Sodium  128 mmol/L  Low  135-145  









 Potassium  4.3 mmol/L    3.5-5.0  

 

 Chloride  92 mmol/L  Low  101-111  

 

 Co2 Carbon Dioxide  27 mmol/L    22-32  

 

 Anion Gap  9 mmol/L    2-11  

 

 Glucose  90 mg/dL      

 

 Blood Urea Nitrogen  31 mg/dL  High  6-24  

 

 Creatinine  1.57 mg/dL  High  0.67-1.17  

 

 One Over Creatinine  0.63 mg/dL  Low  0.67-1.17  

 

 BUN/Creatinine Ratio  19.7    8-20  

 

 Calcium  8.7 mg/dL    8.6-10.3  

 

 Total Protein  6.1 g/dL  Low  6.4-8.9  

 

 Albumin  3.5 g/dL    3.2-5.2  

 

 Globulin  2.6 g/dL    2-4  

 

 Albumin/Globulin Ratio  1.3    1-3  

 

 Total Bilirubin  0.40 mg/dL    0.2-1.0  

 

 Alkaline Phosphatase  93 U/L      

 

 Alt  22 U/L    7-52  

 

 Ast  19 U/L    13-39  

 

 Egfr Non-  42.1    >60  

 

 Egfr   50.9    >60  1









 CBC Auto Diff  2018  White Blood Count  19.1 10^3/uL  High  3.5-10.8  









 Red Blood Count  2.81 10^6/uL  Low  4.00-5.40  

 

 Hemoglobin  9.3 g/dL  Low  14.0-18.0  

 

 Hematocrit  29 %  Low  42-52  

 

 Mean Corpuscular Volume  102 fL  High  80-94  

 

 Mean Corpuscular Hemoglobin  33 pg  High  27-31  

 

 Mean Corpuscular HGB Conc  32 g/dL    31-36  

 

 Red Cell Distribution Width  15 %    10.5-15  

 

 Platelet Count  222 10^3/uL    150-450  

 

 Mean Platelet Volume  9.3 um3    7.4-10.4  

 

 Abs Neutrophils  16.4 10^3/uL  High  1.5-7.7  

 

 Abs Lymphocytes  1.7 10^3/uL    1.0-4.8  

 

 Abs Monocytes  0.9 10^3/uL  High  0-0.8  

 

 Abs Eosinophils  0 10^3/uL    0-0.6  

 

 Abs Basophils  0.1 10^3/uL    0-0.2  

 

 Abs Nucleated RBC  0 10^3/uL      

 

 Granulocyte %  85.7 %  High  38-83  

 

 Lymphocyte %  8.9 %  Low  25-47  

 

 Monocyte %  4.7 %    0-7  

 

 Eosinophil %  0.2 %    0-6  

 

 Basophil %  0.5 %    0-2  

 

 Nucleated Red Blood Cells %  0      









 Laboratory test finding  06/15/2018  Complement C3  99 mg/dL    75 - 175  2









 Complement C4  16 mg/dL    14 - 40  3

 

 Anti Double Stranded Dna AB  43.0 IU/mL      4

 

 Miscellaneous Test  See Comment      5









 Comp Metabolic Panel  06/15/2018  Sodium  129 mmol/L  Low  135-145  









 Potassium  4.4 mmol/L    3.5-5.0  

 

 Chloride  95 mmol/L  Low  101-111  

 

 Co2 Carbon Dioxide  28 mmol/L    22-32  

 

 Anion Gap  6 mmol/L    2-11  

 

 Glucose  133 mg/dL  High    

 

 Blood Urea Nitrogen  24 mg/dL    6-24  

 

 Creatinine  1.42 mg/dL  High  0.67-1.17  

 

 BUN/Creatinine Ratio  16.9    8-20  

 

 Calcium  8.3 mg/dL  Low  8.6-10.3  

 

 Total Protein  5.9 g/dL  Low  6.4-8.9  

 

 Albumin  3.2 g/dL    3.2-5.2  

 

 Globulin  2.7 g/dL    2-4  

 

 Albumin/Globulin Ratio  1.2    1-3  

 

 Total Bilirubin  0.60 mg/dL    0.2-1.0  

 

 Alkaline Phosphatase  121 U/L  High    

 

 Alt  30 U/L    7-52  

 

 Ast  22 U/L    13-39  

 

 Egfr Non-  47.3    >60  

 

 Egfr   60.8    >60  6









 CBC Auto Diff  06/15/2018  White Blood Count  18.0 10^3/uL  High  3.5-10.8  









 Red Blood Count  2.93 10^6/uL  Low  4.00-5.40  

 

 Hemoglobin  9.8 g/dL  Low  14.0-18.0  

 

 Hematocrit  30 %  Low  42-52  

 

 Mean Corpuscular Volume  102 fL  High  80-94  

 

 Mean Corpuscular Hemoglobin  33 pg  High  27-31  

 

 Mean Corpuscular HGB Conc  33 g/dL    31-36  

 

 Red Cell Distribution Width  15 %    10.5-15  

 

 Platelet Count  160 10^3/uL    150-450  

 

 Mean Platelet Volume  9.9 um3    7.4-10.4  

 

 Abs Neutrophils  16.9 10^3/uL  High  1.5-7.7  

 

 Abs Lymphocytes  0.6 10^3/uL  Low  1.0-4.8  

 

 Abs Monocytes  0.4 10^3/uL    0-0.8  

 

 Abs Eosinophils  0 10^3/uL    0-0.6  

 

 Abs Basophils  0 10^3/uL    0-0.2  

 

 Abs Nucleated RBC  0 10^3/uL      

 

 Granulocyte %  94.0 %  High  38-83  

 

 Lymphocyte %  3.5 %  Low  25-47  

 

 Monocyte %  2.2 %    0-7  

 

 Eosinophil %  0.1 %    0-6  

 

 Basophil %  0.2 %    0-2  

 

 Nucleated Red Blood Cells %  0      









 Laboratory test finding  06/15/2018  Erythrocyte Sed Rate  9 mm/Hr    0-40  









 C Reactive Protein  2.55 mg/L    < 5.00  7









 Nuclear AB (Nia) By Ifa  06/15/2018  Nuclear Ab (Nia) by Ifa,  Positive 1:640 
     8



 Igg    IgG        









 Nia Titer:  1:640      

 

 Nia Pattern:  Homogeneous      9









 Laboratory test finding  2017  Alt (SGPT)  31 U/L    7-52  









 Ast (Sgot)  34 U/L    13-39  









 Lipid Profile (Trig/Chol/HDL)  2017  Triglycerides  99 mg/dL      10









 Cholesterol  112 mg/dL      11

 

 HDL Cholesterol  20.2 mg/dL      12

 

 LDL Cholesterol  72 mg/dL      13









 Basic Metabolic Panel  2017  Sodium  136 mmol/L    133-145  









 Potassium  4.0 mmol/L    3.5-5.0  

 

 Chloride  104 mmol/L    101-111  

 

 Co2 Carbon Dioxide  23 mmol/L    22-32  

 

 Anion Gap  9 mmol/L    2-11  

 

 Glucose  101 mg/dL  High    

 

 Blood Urea Nitrogen  19 mg/dL    6-24  

 

 Creatinine  1.41 mg/dL  High  0.67-1.17  

 

 BUN/Creatinine Ratio  13.5    8-20  

 

 Calcium  8.6 mg/dL    8.6-10.3  

 

 Egfr Non-  47.8    >60  

 

 Egfr   61.4    >60  14









 1  *******Because ethnic data is not always readily available,



   this report includes an eGFR for both -Americans and



   non- Americans.****



   The National Kidney Disease Education Program (NKDEP) does



   not endorse the use of the MDRD equation for patients that



   are not between the ages of 18 and 70, are pregnant, have



   extremes of body size, muscle mass, or nutritional status,



   or are non- or non-.



   According to the National Kidney Foundation, irrespective of



   diagnosis, the stage of the disease is based on the level of



   kidney function:



   Stage Description                      GFR(mL/min/1.73 m(2))



   1     Kidney damage with normal or decreased GFR       90



   2     Kidney damage with mild decrease in GFR          60-89



   3     Moderate decrease in GFR                         30-59



   4     Severe decrease in GFR                           15-29



   5     Kidney failure                       <15 (or dialysis)

 

 2  Test Performed by:



   [place] [address]

 

 3  Test Performed by:



   [place] [address]

 

 4  Interpretation: Borderline (30.0-75.0)



   -------------------REFERENCE VALUE--------------------------



   <30.0 (Negative)



   Test Performed by:



   [address] [address]

 

 5  Test                        Result    Flag  Unit   RefValue



   ------------------------------------------------------------



   Vitamin B12 and Folate, S



   Vitamin B12 Assay, S      557             ng/L   180 - 914



   Folate, S                 14.4            mcg/L  >=4.0



   Test Performed by:



   [facility]



   [address] [place] [id number]



   



   



   ******CORRECTED REPORT******



   ---  Corrected on 18 1238 ---



   Ref Lab Test previously reported as:



   SEE COMMENTS



   Test                        Result    Flag  Unit   RefValue



   ------------------------------------------------------------



   Vitamin B12 and Folate, S



   Vitamin B12 Assay, S      557             ng/L   180 - 914



   



   Folate, S                 15.9            mcg/L  >=4.0



   



   Test Performed by:



   [facility]



   [facility] [email or web address] [address]

 

 6  *******Because ethnic data is not always readily available,



   this report includes an eGFR for both -Americans and



   non- Americans.****



   The National Kidney Disease Education Program (NKDEP) does



   not endorse the use of the MDRD equation for patients that



   are not between the ages of 18 and 70, are pregnant, have



   extremes of body size, muscle mass, or nutritional status,



   or are non- or non-.



   According to the National Kidney Foundation, irrespective of



   diagnosis, the stage of the disease is based on the level of



   kidney function:



   Stage Description                      GFR(mL/min/1.73 m(2))



   1     Kidney damage with normal or decreased GFR       90



   2     Kidney damage with mild decrease in GFR          60-89



   3     Moderate decrease in GFR                         30-59



   4     Severe decrease in GFR                           15-29



   5     Kidney failure                       <15 (or dialysis)

 

 7  Acute inflammation:  >10.00

 

 8  -------------------REFERENCE VALUE--------------------------



   <1:80 (Negative)

 

 9  Test Performed by:



   [address] [address]

 

 10  Desirable <150



   Borderline high 150-199



   High 200-499



   Very High >500

 

 11  Desirable <200



   Borderline high 200-239



   High >239

 

 12  Low <40



   Desirable: 40-60



   High: >60

 

 13  Desirable: <100 mg/dL



   Near Optimal: 100-129 mg/dL



   Borderline High: 130-159 mg/dL



   High: 160-189 mg/dL



   Very High: >189 mg/dL

 

 14  *******Because ethnic data is not always readily available,



   this report includes an eGFR for both -Americans and



   non- Americans.****



   The National Kidney Disease Education Program (NKDEP) does



   not endorse the use of the MDRD equation for patients that



   are not between the ages of 18 and 70, are pregnant, have



   extremes of body size, muscle mass, or nutritional status,



   or are non- or non-.



   According to the National Kidney Foundation, irrespective of



   diagnosis, the stage of the disease is based on the level of



   kidney function:



   Stage Description                      GFR(mL/min/1.73 m(2))



   1     Kidney damage with normal or decreased GFR       90



   2     Kidney damage with mild decrease in GFR          60-89



   3     Moderate decrease in GFR                         30-59



   4     Severe decrease in GFR                           15-29



   5     Kidney failure                       <15 (or dialysis)







Procedures







 Date  CPT Code  Description  Status

 

 10/05/2018  34564  EKG Tracing & Interpretation  Completed

 

 2018  54013  ECHO Transthorasic Realtime 2D W Doppler & Color Flow  
Completed



     Hosp  

 

 2018  03141  Pace Maker Eval W/Iterative Adjment Dual Lead  Completed

 

 2018  96964  Pace Maker Eval W/Iterative Adjment Dual Lead  Completed

 

 2018  79880  EKG, Interpretation Only  Completed

 

 2018  55050  Pace Maker Eval W/Iterative Adjment Dual Lead  Completed

 

 2018  87651  EKG, Interpretation Only  Completed

 

 2018  02472  EKG, Interpretation Only  Completed

 

 2018  61272  Perm Pacemaker Av Sequential Atrial And Ventricular  
Completed

 

 2018  60041  ECHO Transthorasic Realtime 2D W Doppler & Color Flow  
Completed



     Hosp  

 

 2018  42044  Each Additional Biopsy  Completed

 

 2018  17761  Biopsy Skin Lesion Single  Completed

 

 2018  53023  EKG Tracing & Interpretation  Completed

 

 2018  11979  Implant Cardiac Loop Recorder  Completed

 

 2018  88324  Echocardiogram, Limited Study  Completed

 

 2018  47378  EKG, Interpretation Only  Completed

 

 2018  61666  ECHO Transthorasic Realtime 2D W Doppler & Color Flow  
Completed



     Hosp  

 

 2017  60813  EKG Tracing & Interpretation  Completed

 

 2017  52363  EKG Tracing & Interpretation  Completed

 

 2017  15369  EKG, Interpretation Only  Completed

 

 2017  52182  Left Heart Cath. Incl S/I Coronaries, Angio S/I V Gram  
Completed



     If Done  

 

 2006  03830  Color Doppler  Completed

 

 2006  91038  Pulse Doppler & Continuous Wave  Completed

 

 2006  47963  Pulse Doppler & Continuous Wave  Completed

 

 2006  31952  Echocardiogram  Completed







Encounters







 Type  Date  Location  Provider  CPT E/M  Dx

 

 Office Visit  2018  daphney Matthews, N.PMaureen  [id number]  
I50.23



   9:52a  [facility]      









 I21.4

 

 N18.3

 

 I13.0

 

 I48.91









 Office Visit  2018  9:52a  [facility]  Abbie Cardenas, N.P.  
[address]  I21.4



     [facility]ospital[facility]      









 N18.3

 

 I50.33

 

 I13.0









 Office Visit  2018  9:51a  [facility]  [place],  [address]  
I50.33



     [facility]  M.D.,FACP    









 I21.4

 

 N18.3

 

 D64.9









 Office Visit  2018  9:51a  [facility]  Grecia Vasquez,  [address]  
I50.23



     [facility]  [place]    









 I13.0

 

 N18.9

 

 I48.91









 Office Visit  2018  8:40a  [facility]  [place]y Castro,  
[address]  M35.9



     Felicia WEN    









 Z79.899

 

 R60.9

 

 N18.9









 Office Visit  2018 10:30a  [facility]  Sha Briseno,  
[address]15  J18.9



     Infectious Diseases  M.D.    

 

 Office Visit  2018 12:59p  [facility]  Sha Briseno,  
52658  J18.9



     Infectious Diseases  M.TESHA    









 I49.5

 

 Z95.0

 

 N18.3









 Office Visit  2018 10:29a  [facility],  Eze Baltazar MD  86882
  A41.9



     Hospitalists      









 J18.9

 

 I13.0

 

 N18.3

 

 I50.32









 Office Visit  2018 10:29a  [facility],  Eze Baltazar MD  84373
  J18.9



     Hospitalists      









 I50.32

 

 I13.0

 

 N18.3









 Office Visit  2018 10:28a  [facility],  Eze Baltazar MD  98703
  A41.9



     Hospitalists      









 J18.9

 

 I50.32

 

 I13.0

 

 N18.3









 Office Visit  2018 10:28a  [facility],  Nathan Grande,  
05579  J18.9



     Hospitalists  N.P.    









 I50.9

 

 I13.0

 

 N18.9









 Office Visit  2018  3:00p  [facility]  Davy Faria,  
86271  I49.5



     Felicia WEN    









 I25.10

 

 I46.9









 Office Visit  2018  4:00p  [facility]  Davey Castro,  
21174  D72.1



     Felicia WEN    









 M31.8

 

 Z79.899

 

 Z79.52

 

 M35.9









 Office Visit  2018  9:07a  [facility]  80157  I50.33



     Assoc, Hospitaljohanna Laura MD    









 I21.4

 

 M31.8









 Office Visit  2018 10:31a  [facility]  Janelle Burk MD  
96380  D72.1



     [facility]      

 

 Office Visit  2018  7:00a  [facility]  Davey Castro,  
08242  R76.0



     Felicia WEN    









 D72.1

 

 R21









 Office Visit  2018  9:06a  [facility]  07152  I50.33



     Assoc, Jackson Laura MD    









 I21.4

 

 M31.8









 Office Visit  2018  9:57a  [facility]  Keely Vazquez M.D.  
18997  Z01.810



     [facility]      









 D72.1

 

 I21.A1

 

 I50.30

 

 I25.10

 

 Z95.1









 Office Visit  2018  7:00a  [facility]  Davey Castro,  
52268  D72.1



     Felicia WEN    









 R21









 Office Visit  2018 10:30a  [facility]  Janelle Burk MD  
06536  D72.1



     [facility]      

 

 Office Visit  2018  9:06a  [facility]  28948  I50.33



     Assoc, Jackson Laura MD    









 I21.4

 

 M31.8









 Office Visit  2018  9:05a  [facility]  46256  I50.33



     Assoc, Jackson Laura MD    









 I21.4

 

 M31.8









 Office Visit  2018  9:04a  [facility] ,daphney Galicia,  
10238  M31.8



     Hospitaljohanna WEN,FACP    









 I50.33









 Office Visit  2018  9:03a  [facility]  Hira Galicia,  82082  
I50.33



     Assoc, Hospitaljohanna WEN,FACP    









 M31.8









 Office Visit  2018  3:16p  [facility]  Qutaybeh S. Gloriaydmatheus,  
06175  I50.9



       M.D.    









 I25.10

 

 I42.9

 

 D64.9









 Office Visit  2018  9:03a  [facility]  Jennifer Graham,  
03930  I50.33



     Hospitalists  M.D.    









 I21.4









 Office Visit  2018  7:00a  [facility]  Davey Castro,  
13836  D72.1



     Cma  M.D.    









 R21









 Office Visit  2018  9:03a  [facility]  Jennifer Gladys,  
42829  I50.33



     Hospitalists  M.D.    









 I21.4









 Office Visit  2018  3:09p  [facility]  Qutaybronel S. Gloriaydmatheus,  
84217  I50.9



       M.D.    









 I42.9

 

 I25.10

 

 Z95.1









 Office Visit  2018  3:20p  [facility]  Janelle Burk MD  
65027  D72.1



     [facility]      

 

 Office Visit  2018  9:02a  [facility],  Jennifer Graham,  
40445  I50.33



     Hospitalists  M.D.    









 I21.4









 Office Visit  2018  3:03p  [facility]  Davian Severino DO  
36013  I21.A1



     [facility]    









 I50.31

 

 R07.9









 Office Visit  2018  9:01a  [facility],  Jennifer Gladys,  
69985  I50.33



     Hospitalists  M.D.    









 I21.4









 Office Visit  2018  3:41p  [facility]  Qutayb S. Magchasydmatheus,  
06730  I11.0



       M.D.    









 I50.9

 

 I25.10

 

 E78.5

 

 I45.10

 

 E87.6

 

 E83.42

 

 R79.89









 Office Visit  2018  1:40p  [facility]  Tay Corcoran M.D.,  
45386  I25.10



     [facility] AT [facility]    









 I10

 

 I45.19

 

 R55

 

 E78.5









 Office Visit  2018  9:25a  [facility],  Anita Bassett M.D.
  72692  R55



     Hospitalists      









 R79.89

 

 R21

 

 I25.10









 Office Visit  2018  9:23a  [facility]  Anita Bassett M.D.
  37027  R55



     Hospitalists      









 R79.89

 

 R21

 

 I25.10









 Office Visit  2018  9:22a  [facility]  MANJULA Chilel.KAYLIN  
89802  R55



     Hospitalists      









 R79.89

 

 R21

 

 I25.10









 Office Visit  2018  9:18a  [facility]  Le Garcias,  
05666  R55



     Hospitalists  D.OMaureen    









 R79.89

 

 R21

 

 I25.10









 Office Visit  2018  2:15p  [facility]  Davy Faria,  
23009  R55



     [place] [facility]  MTIFFANY    









 I10

 

 I45.19

 

 Z95.818

 

 S01.01xA

 

 Z48.02









 Office Visit  2018  9:03a  [facility],  Tatyana Caldwell NP  
19351  R55



     Hospitalists      









 S01.01xA

 

 I50.32

 

 I10









 Office Visit  2018  9:30a  [facility]  Davy Faria,  
09313  R55



       M.D.    

 

 Office Visit  2018  9:02a  [facility],  Char Sanchez  
96170  R55



     Hospitalists  SURYA Zaman    









 S01.01xA

 

 I50.32

 

 I10









 Office Visit  2018  9:29a  [facility],  Eze Baltazar MD  59285
  I50.9



     Hospitalists      









 J18.9

 

 E87.8

 

 N18.3









 Office Visit  2018  9:28a  [facility],  Char Sanchez  
11419  I50.9



     Hospitalists  SURYA Zaman    









 J18.9

 

 E87.8

 

 N18.3









 Office Visit  2018  9:27a  [facility],  Natividad Camacho DO  
18968  I50.9



     Hospitalists      









 J18.9

 

 M79.602









 Office Visit  2018  9:26a  [facility],  Lakhwinder David  
88561  M79.602



     Hospitalists  M.D.    









 I50.9

 

 J18.9

 

 E87.8









 Office Visit  2018  9:25a  [facility]  Lakhwinder David,  
53223  M79.602



     Hospitalists  M.D.    









 I50.9

 

 J18.9

 

 E87.8









 Office Visit  2018  9:24a  [facility]  Lakhwinder David,  
15067  M79.602



     Hospitalists  M.D.    









 I50.9

 

 J18.9

 

 E87.8









 Office Visit  2018  9:56a  [facility]  Eliazarronel Bran,  
42644  R50.9



       M.D.    









 R53.83

 

 D72.829

 

 R94.31

 

 I45.19

 

 I44.4

 

 I25.10

 

 I10

 

 R07.9









 Office Visit  2018  9:22a  [facility]  46191  
M79.602



     Assoc,pc Hospitalists  SURYA Zaman    









 I50.9

 

 J18.9

 

 E87.8









 Office Visit  2018  7:10a  [facility]  Lakhwinder David,  
95808  I50.9



     Hospitalists  M.D.    









 R06.09

 

 I25.10









 Office Visit  2018  7:09a  [facility]  Lakhwinder David,  
32263  I50.9



     Hospitalists  M.D.    









 R06.09

 

 I25.10









 Office Visit  2018  7:09a  [facility]  Anita Bassett,  
47892  I50.9



     Hospitalists  M.D.    









 I25.10

 

 R06.09









 Office Visit  01/15/2018  7:08a  [facility]  Eze Baltazar MD  98771
  I50.9



     Hospitalists      









 R06.09

 

 I25.10









 Office Visit  2017 11:20a  [facility]  Tay Corcoran M.D.,  
00802  I25.10



     [facility] AT [facility]    









 I35.0

 

 I12.9

 

 E78.5

 

 N18.3

 

 I48.0









 Office Visit  2017 11:20a  [facility] Rashel Corcoran M.D.,  
03803  I25.10



     C[facility] AT [facility]    









 I35.0

 

 I35.1

 

 I10

 

 E78.5

 

 I45.10

 

 I48.0









 Office Visit  2017 10:16a  [facility]  Tay Corcoran M.D.,  
04938  R07.9



     Cma AT [facility]    









 R94.31







Plan of Care

Future Appointment(s):11/15/2018 10:20 am - Davey Castro M.D. at [facility] 
[facility]10/2018 - Davy Faria M.D.N18.3 Chronic kidney disease, 
stage 3 (moderate)I21.4 Non-St elevation (Nstemi) myocardial amhcnpmsztF76.32 
Chronic diastolic (congestive) heart glqrxovG79.0 Presence of cardiac 
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 
pctrsFollow up:6 months Farhana Li is a 58 y.o. female (: 1955) presenting to address:    Chief Complaint   Patient presents with    Abdominal Pain       Vitals:    18 1020   BP: 112/80   Pulse: 86   Resp: 20   Temp: 98.6 °F (37 °C)   TempSrc: Oral   SpO2: 95%   Weight: 229 lb (103.9 kg)   Height: 5' 6\" (1.676 m)   PainSc:   4   PainLoc: Abdomen       Learning Assessment:     Learning Assessment 10/6/2015   PRIMARY LEARNER Patient   HIGHEST LEVEL OF EDUCATION - PRIMARY LEARNER  SOME COLLEGE   BARRIERS PRIMARY LEARNER NONE   CO-LEARNER CAREGIVER No   PRIMARY LANGUAGE ENGLISH    NEED No   LEARNER PREFERENCE PRIMARY READING   LEARNING SPECIAL TOPICS none   ANSWERED BY patient   RELATIONSHIP SELF   ASSESSMENT COMMENT n/a     Depression Screening:     PHQ over the last two weeks 2018   PHQ Not Done Active Diagnosis of Depression or Bipolar Disorder   Little interest or pleasure in doing things -   Feeling down, depressed or hopeless -   Total Score PHQ 2 -   Trouble falling or staying asleep, or sleeping too much -   Feeling tired or having little energy -   Poor appetite or overeating -   Feeling bad about yourself - or that you are a failure or have let yourself or your family down -   Trouble concentrating on things such as school, work, reading or watching TV -   Moving or speaking so slowly that other people could have noticed; or the opposite being so fidgety that others notice -   Thoughts of being better off dead, or hurting yourself in some way -   PHQ 9 Score -   How difficult have these problems made it for you to do your work, take care of your home and get along with others -     Fall Risk Assessment:     Fall Risk Assessment, last 12 mths 2018   Able to walk? Yes   Fall in past 12 months? Yes   Fall with injury? No   Number of falls in past 12 months 1   Fall Risk Score 1     Abuse Screening:     Abuse Screening Questionnaire 2018   Do you ever feel afraid of your partner?  N   Are you in a relationship with someone who physically or mentally threatens you? N   Is it safe for you to go home? Y     Coordination of Care Questionaire:   1. Have you been to the ER, urgent care clinic since your last visit? Hospitalized since your last visit? YES. [place] -4/2018 surgery    2. Have you seen or consulted any other health care providers outside of the [address] [place] [address] since your last visit? Include any pap smears or colon screening. YES. Breast surgeon 5/18/18    Advanced Directive:   1. Do you have an Advanced Directive? YES    2. Would you like information on Advanced Directives?  NO

## 2018-10-12 ENCOUNTER — TELEPHONE (OUTPATIENT)
Dept: FAMILY MEDICINE CLINIC | Age: 63
End: 2018-10-12

## 2018-10-12 NOTE — TELEPHONE ENCOUNTER
Please extend the dates for the CT's patient has not had them done yet for medical reasons, per her insurance company this should be no problem for approval.

## 2018-10-15 NOTE — TELEPHONE ENCOUNTER
The [employer], had to start the prior authorization process over at [phone]. Her [address] CT Ab/Pelvis extended 10/15/18-11/29/18. Left msg for pt that this code approved.

## 2018-10-30 RX ORDER — OMEPRAZOLE 40 MG/1
CAPSULE, DELAYED RELEASE ORAL
Qty: 90 CAP | Refills: 3 | Status: SHIPPED | OUTPATIENT
Start: 2018-10-30 | End: 2019-09-26 | Stop reason: SDUPTHER

## 2018-11-08 ENCOUNTER — TELEPHONE (OUTPATIENT)
Dept: FAMILY MEDICINE CLINIC | Age: 63
End: 2018-11-08

## 2018-11-08 ENCOUNTER — PATIENT MESSAGE (OUTPATIENT)
Dept: FAMILY MEDICINE CLINIC | Age: 63
End: 2018-11-08

## 2018-12-06 RX ORDER — TRAZODONE HYDROCHLORIDE 100 MG/1
100 TABLET ORAL
Qty: 90 TAB | Refills: 0 | Status: SHIPPED | OUTPATIENT
Start: 2018-12-06

## 2018-12-06 RX ORDER — TRAZODONE HYDROCHLORIDE 100 MG/1
100 TABLET ORAL
Qty: 90 TAB | Refills: 0 | Status: SHIPPED | OUTPATIENT
Start: 2018-12-06 | End: 2018-12-06 | Stop reason: SDUPTHER

## 2018-12-07 DIAGNOSIS — F32.A ANXIETY AND DEPRESSION: Chronic | ICD-10-CM

## 2018-12-07 DIAGNOSIS — F41.9 ANXIETY AND DEPRESSION: Chronic | ICD-10-CM

## 2018-12-07 RX ORDER — DESVENLAFAXINE 100 MG/1
100 TABLET, EXTENDED RELEASE ORAL DAILY
Qty: 90 TAB | Refills: 1 | Status: SHIPPED | OUTPATIENT
Start: 2018-12-07 | End: 2019-02-08 | Stop reason: SDUPTHER

## 2018-12-07 NOTE — TELEPHONE ENCOUNTER
Patient calling to request refill on: 
 
 
Remaining pills:  
Last appt: 8/20/18 Next appt: no upcoming appt Pharmacy: 
 
 
Patient aware of 72 hour time frame. Pt is just requesting a  Temporary refill of 2 weeks because she left her supply at home. Please advise

## 2019-02-08 DIAGNOSIS — F41.9 ANXIETY AND DEPRESSION: Chronic | ICD-10-CM

## 2019-02-08 DIAGNOSIS — F32.A ANXIETY AND DEPRESSION: Chronic | ICD-10-CM

## 2019-02-08 RX ORDER — DESVENLAFAXINE 100 MG/1
TABLET, EXTENDED RELEASE ORAL
Qty: 90 TAB | Refills: 1 | Status: SHIPPED | OUTPATIENT
Start: 2019-02-08 | End: 2019-07-25 | Stop reason: SDUPTHER

## 2019-02-11 DIAGNOSIS — F41.9 ANXIETY AND DEPRESSION: Chronic | ICD-10-CM

## 2019-02-11 DIAGNOSIS — F32.A ANXIETY AND DEPRESSION: Chronic | ICD-10-CM

## 2019-02-11 RX ORDER — BUPROPION HYDROCHLORIDE 150 MG/1
150 TABLET ORAL
Qty: 90 TAB | Refills: 0 | Status: SHIPPED | OUTPATIENT
Start: 2019-02-11

## 2019-05-08 ENCOUNTER — TELEPHONE (OUTPATIENT)
Dept: FAMILY MEDICINE CLINIC | Age: 64
End: 2019-05-08

## 2019-05-08 NOTE — TELEPHONE ENCOUNTER
Received a staff message from margarette Fall. See below. \"Pt is unsure if the doctor can write her a Rx for \"lexaro (anxiety)\" or if she needs to be seen first.  
 
[facility]: [phone] Fax: [phone] Pt's best contact: [phone]"

## 2019-09-26 RX ORDER — OMEPRAZOLE 40 MG/1
CAPSULE, DELAYED RELEASE ORAL
Qty: 90 CAP | Refills: 3 | Status: SHIPPED | OUTPATIENT
Start: 2019-09-26

## 2019-10-30 ENCOUNTER — TELEPHONE (OUTPATIENT)
Dept: FAMILY MEDICINE CLINIC | Age: 64
End: 2019-10-30

## 2019-10-30 NOTE — TELEPHONE ENCOUNTER
I won't be able to provide this letter. I haven't seen her in over a year and we never discussed her dogs as emotional support animals.   Would need to come from psych/therapist

## 2019-10-30 NOTE — TELEPHONE ENCOUNTER
Pt called, left msg at nurse station. Pt has a family emergency in [place] and requests have her dog certified as an emotional support animal so he can fly with her to help with her clinical depression. She is asking for a letter. I did call patient, left msg,  to ask more specifics with travel dates, etc.

## 2019-10-31 NOTE — TELEPHONE ENCOUNTER
Spoke with patient. She verbalized understanding. She has moved to [facility] and is not seeing a therapist there yet.

## 2020-06-10 ENCOUNTER — TELEPHONE (OUTPATIENT)
Dept: FAMILY MEDICINE CLINIC | Age: 65
End: 2020-06-10

## 2020-06-10 NOTE — TELEPHONE ENCOUNTER
[place] but is looking to speak to a nurse about her care here with Dr. Latoya Kearney. She is trying to not have to wear a mask and thinks that Dr. Latoya Kearney was the one who diagnosed her with something that could be the reason that she can't wear a mask. I told her that a nurse may not be able to get the her until tomorrow.

## 2020-06-11 NOTE — TELEPHONE ENCOUNTER
Called pt back. She had an incident trying to get an xray in [place] and they denied her service w/o a mask. Pt requested her records. Mailing her a blank med release to send back to us to forward to Ivelisse Grey. I told her our office protocol and it was doubtful even if we could do a letter that they would honor it. She said she didn't need a lecture. I explained that I not trying to lecture her just giving her the information regarding exposure, etc. She just asked for her records now.

## 2020-06-12 NOTE — TELEPHONE ENCOUNTER
If patient calls back, I do not have any medical condition that would support not wearing a mask. I will be unable to furnish any letter stating this. However, we are happy to provide the pt with medical records requested

## 2022-03-19 PROBLEM — E66.01 SEVERE OBESITY (BMI 35.0-39.9) WITH COMORBIDITY (HCC): Status: ACTIVE | Noted: 2018-03-26

## 2022-03-19 PROBLEM — E53.8 B12 DEFICIENCY: Status: ACTIVE | Noted: 2017-07-19

## 2022-03-20 PROBLEM — C50.912 INFILTRATING DUCTAL CARCINOMA OF LEFT BREAST (HCC): Status: ACTIVE | Noted: 2018-02-20

## 2022-03-20 PROBLEM — Z79.899 CONTROLLED SUBSTANCE AGREEMENT SIGNED: Status: ACTIVE | Noted: 2017-07-18

## 2023-05-24 RX ORDER — LORAZEPAM 2 MG/1
2 TABLET ORAL
COMMUNITY
Start: 2018-08-20

## 2023-05-24 RX ORDER — BUPROPION HYDROCHLORIDE 150 MG/1
150 TABLET ORAL
COMMUNITY
Start: 2019-02-11

## 2023-05-24 RX ORDER — EXEMESTANE 25 MG/1
25 TABLET ORAL DAILY
COMMUNITY

## 2023-05-24 RX ORDER — OMEPRAZOLE 40 MG/1
1 CAPSULE, DELAYED RELEASE ORAL DAILY
COMMUNITY
Start: 2019-09-26

## 2023-05-24 RX ORDER — IBUPROFEN 200 MG
CAPSULE ORAL DAILY
COMMUNITY

## 2023-05-24 RX ORDER — ALBUTEROL SULFATE 90 UG/1
2 AEROSOL, METERED RESPIRATORY (INHALATION) EVERY 6 HOURS PRN
COMMUNITY
Start: 2018-05-21

## 2023-05-24 RX ORDER — DESVENLAFAXINE 100 MG/1
1 TABLET, EXTENDED RELEASE ORAL DAILY
COMMUNITY
Start: 2019-07-25

## 2023-05-24 RX ORDER — ZOLMITRIPTAN 2.5 MG/1
2.5 TABLET, FILM COATED ORAL PRN
COMMUNITY
Start: 2016-10-12

## 2023-05-24 RX ORDER — CLOBETASOL PROPIONATE 0.05 G/100ML
SHAMPOO TOPICAL
COMMUNITY
Start: 2018-05-21

## 2023-05-24 RX ORDER — TRAZODONE HYDROCHLORIDE 100 MG/1
100 TABLET ORAL
COMMUNITY
Start: 2018-12-06